# Patient Record
Sex: FEMALE | Race: WHITE | NOT HISPANIC OR LATINO | Employment: UNEMPLOYED | RURAL
[De-identification: names, ages, dates, MRNs, and addresses within clinical notes are randomized per-mention and may not be internally consistent; named-entity substitution may affect disease eponyms.]

---

## 2020-11-25 ENCOUNTER — HISTORICAL (OUTPATIENT)
Dept: ADMINISTRATIVE | Facility: HOSPITAL | Age: 65
End: 2020-11-25

## 2020-11-25 LAB
ALBUMIN SERPL BCP-MCNC: 4.1 G/DL (ref 3.5–5)
ALBUMIN/GLOB SERPL: 1.4 {RATIO}
ALP SERPL-CCNC: 83 U/L (ref 50–130)
ALT SERPL W P-5'-P-CCNC: 18 U/L (ref 13–56)
AMORPH PHOS CRY #/AREA URNS LPF: ABNORMAL /LPF
ANION GAP SERPL CALCULATED.3IONS-SCNC: 9 MMOL/L (ref 7–16)
AST SERPL W P-5'-P-CCNC: 14 U/L (ref 15–37)
BACTERIA #/AREA URNS HPF: ABNORMAL /HPF
BASOPHILS # BLD AUTO: 0.04 X10E3/UL (ref 0–0.2)
BASOPHILS NFR BLD AUTO: 0.7 % (ref 0–1)
BILIRUB SERPL-MCNC: 1 MG/DL (ref 0–1.2)
BILIRUB UR QL STRIP: NEGATIVE MG/DL
BUN SERPL-MCNC: 17 MG/DL (ref 7–18)
BUN/CREAT SERPL: 17
CALCIUM SERPL-MCNC: 8.8 MG/DL (ref 8.5–10.1)
CAOX CRY #/AREA URNS LPF: ABNORMAL /LPF
CHLORIDE SERPL-SCNC: 107 MMOL/L (ref 98–107)
CHOLEST SERPL-MCNC: 163 MG/DL
CHOLEST/HDLC SERPL: 2.8 {RATIO}
CLARITY UR: CLEAR
CO2 SERPL-SCNC: 28 MMOL/L (ref 21–32)
COLOR UR: YELLOW
CREAT SERPL-MCNC: 0.98 MG/DL (ref 0.5–1.02)
EOSINOPHIL # BLD AUTO: 0.24 X10E3/UL (ref 0–0.5)
EOSINOPHIL NFR BLD AUTO: 3.9 % (ref 1–4)
ERYTHROCYTE [DISTWIDTH] IN BLOOD BY AUTOMATED COUNT: 12.1 % (ref 11.5–14.5)
ERYTHROCYTE [SEDIMENTATION RATE] IN BLOOD BY WESTERGREN METHOD: 2 MM/HR (ref 0–30)
GLOBULIN SER-MCNC: 2.9 G/DL (ref 2–4)
GLUCOSE SERPL-MCNC: 83 MG/DL (ref 74–106)
GLUCOSE UR STRIP-MCNC: NEGATIVE MG/DL
HCT VFR BLD AUTO: 41.4 % (ref 38–47)
HDLC SERPL-MCNC: 59 MG/DL
HGB BLD-MCNC: 13.6 G/DL (ref 12–16)
IMM GRANULOCYTES # BLD AUTO: 0.02 X10E3/UL (ref 0–0.04)
IMM GRANULOCYTES NFR BLD: 0.3 % (ref 0–0.4)
KETONES UR STRIP-SCNC: NEGATIVE MG/DL
LDLC SERPL CALC-MCNC: 81 MG/DL
LEUKOCYTE ESTERASE UR QL STRIP: NEGATIVE LEU/UL
LYMPHOCYTES # BLD AUTO: 1.83 X10E3/UL (ref 1–4.8)
LYMPHOCYTES NFR BLD AUTO: 30.1 % (ref 27–41)
MCH RBC QN AUTO: 29.5 PG (ref 27–31)
MCHC RBC AUTO-ENTMCNC: 32.9 G/DL (ref 32–36)
MCV RBC AUTO: 89.8 FL (ref 80–96)
MONOCYTES # BLD AUTO: 0.72 X10E3/UL (ref 0–0.8)
MONOCYTES NFR BLD AUTO: 11.8 % (ref 2–6)
MPC BLD CALC-MCNC: 10.9 FL (ref 9.4–12.4)
MUCOUS THREADS #/AREA URNS HPF: ABNORMAL /HPF
NEUTROPHILS # BLD AUTO: 3.23 X10E3/UL (ref 1.8–7.7)
NEUTROPHILS NFR BLD AUTO: 53.2 % (ref 53–65)
NITRITE UR QL STRIP: NEGATIVE
NRBC # BLD AUTO: 0 X10E3/UL (ref 0–0)
NRBC, AUTO (.00): 0 /100 (ref 0–0)
PH UR STRIP: 5 PH UNITS (ref 5–8)
PLATELET # BLD AUTO: 256 X10E3/UL (ref 150–400)
POTASSIUM SERPL-SCNC: 3.9 MMOL/L (ref 3.5–5.1)
PROT SERPL-MCNC: 7 G/DL (ref 6.4–8.2)
PROT UR QL STRIP: NEGATIVE MG/DL
RBC # BLD AUTO: 4.61 X10E6/UL (ref 4.2–5.4)
RBC # UR STRIP: ABNORMAL ERY/UL
RBC #/AREA URNS HPF: ABNORMAL /HPF (ref 0–3)
SODIUM SERPL-SCNC: 140 MMOL/L (ref 136–145)
SP GR UR STRIP: >=1.03 (ref 1–1.03)
SQUAMOUS #/AREA URNS LPF: ABNORMAL /LPF
TRIGL SERPL-MCNC: 115 MG/DL
UROBILINOGEN UR STRIP-ACNC: 0.2 EU/DL
WBC # BLD AUTO: 6.08 X10E3/UL (ref 4.5–11)
WBC #/AREA URNS HPF: ABNORMAL /HPF (ref 0–5)

## 2020-11-27 LAB
REPORT: NORMAL

## 2020-12-01 LAB
INSULIN SERPL-ACNC: NORMAL U[IU]/ML
LAB AP COMMENTS: NORMAL
LAB AP GENERAL CAT - HISTORICAL: NORMAL
LAB AP INTERPRETATION/RESULT - HISTORICAL: NEGATIVE
LAB AP SPECIMEN ADEQUACY - HISTORICAL: NORMAL
LAB AP SPECIMEN SUBMITTED - HISTORICAL: NORMAL

## 2020-12-04 ENCOUNTER — HISTORICAL (OUTPATIENT)
Dept: ADMINISTRATIVE | Facility: HOSPITAL | Age: 65
End: 2020-12-04

## 2021-03-23 ENCOUNTER — TELEPHONE (OUTPATIENT)
Dept: OBSTETRICS AND GYNECOLOGY | Facility: CLINIC | Age: 66
End: 2021-03-23

## 2021-03-25 ENCOUNTER — OFFICE VISIT (OUTPATIENT)
Dept: FAMILY MEDICINE | Facility: CLINIC | Age: 66
End: 2021-03-25
Payer: MEDICARE

## 2021-03-25 VITALS
TEMPERATURE: 97 F | OXYGEN SATURATION: 99 % | WEIGHT: 120 LBS | HEART RATE: 105 BPM | HEIGHT: 61 IN | DIASTOLIC BLOOD PRESSURE: 64 MMHG | SYSTOLIC BLOOD PRESSURE: 106 MMHG | RESPIRATION RATE: 18 BRPM | BODY MASS INDEX: 22.66 KG/M2

## 2021-03-25 DIAGNOSIS — M26.609 TMJ (TEMPOROMANDIBULAR JOINT DISORDER): Primary | ICD-10-CM

## 2021-03-25 DIAGNOSIS — M25.511 ACUTE PAIN OF RIGHT SHOULDER: ICD-10-CM

## 2021-03-25 DIAGNOSIS — I10 ESSENTIAL HYPERTENSION: Chronic | ICD-10-CM

## 2021-03-25 PROCEDURE — 99213 PR OFFICE/OUTPT VISIT, EST, LEVL III, 20-29 MIN: ICD-10-PCS | Mod: 25,,, | Performed by: FAMILY MEDICINE

## 2021-03-25 PROCEDURE — 96372 THER/PROPH/DIAG INJ SC/IM: CPT | Mod: ,,, | Performed by: FAMILY MEDICINE

## 2021-03-25 PROCEDURE — 96372 PR INJECTION,THERAP/PROPH/DIAG2ST, IM OR SUBCUT: ICD-10-PCS | Mod: ,,, | Performed by: FAMILY MEDICINE

## 2021-03-25 PROCEDURE — 99213 OFFICE O/P EST LOW 20 MIN: CPT | Mod: 25,,, | Performed by: FAMILY MEDICINE

## 2021-03-25 PROCEDURE — 73030 X-RAY EXAM OF SHOULDER: CPT | Mod: TC,RT,RHCUB | Performed by: FAMILY MEDICINE

## 2021-03-25 RX ORDER — METHYLPREDNISOLONE ACETATE 80 MG/ML
80 INJECTION, SUSPENSION INTRA-ARTICULAR; INTRALESIONAL; INTRAMUSCULAR; SOFT TISSUE
Status: COMPLETED | OUTPATIENT
Start: 2021-03-25 | End: 2021-03-25

## 2021-03-25 RX ORDER — CITALOPRAM 40 MG/1
TABLET, FILM COATED ORAL
COMMUNITY
Start: 2021-01-23 | End: 2022-07-22 | Stop reason: SDUPTHER

## 2021-03-25 RX ORDER — ROSUVASTATIN CALCIUM 10 MG/1
TABLET, COATED ORAL
COMMUNITY
Start: 2021-01-23 | End: 2022-07-22 | Stop reason: SDUPTHER

## 2021-03-25 RX ORDER — TIZANIDINE 4 MG/1
4 TABLET ORAL NIGHTLY PRN
Qty: 30 TABLET | Refills: 11 | Status: SHIPPED | OUTPATIENT
Start: 2021-03-25 | End: 2021-04-04

## 2021-03-25 RX ORDER — ASPIRIN 81 MG/1
81 TABLET ORAL DAILY
COMMUNITY
End: 2022-07-22

## 2021-03-25 RX ORDER — MELOXICAM 15 MG/1
15 TABLET ORAL DAILY PRN
Qty: 30 TABLET | Refills: 11 | Status: SHIPPED | OUTPATIENT
Start: 2021-03-25 | End: 2022-07-22 | Stop reason: SDUPTHER

## 2021-03-25 RX ORDER — LISINOPRIL 20 MG/1
TABLET ORAL
COMMUNITY
Start: 2021-01-23 | End: 2022-07-22 | Stop reason: SDUPTHER

## 2021-03-25 RX ORDER — TOLTERODINE 4 MG/1
CAPSULE, EXTENDED RELEASE ORAL
COMMUNITY
Start: 2021-01-22 | End: 2021-10-20 | Stop reason: SDUPTHER

## 2021-03-25 RX ADMIN — METHYLPREDNISOLONE ACETATE 80 MG: 80 INJECTION, SUSPENSION INTRA-ARTICULAR; INTRALESIONAL; INTRAMUSCULAR; SOFT TISSUE at 04:03

## 2021-03-26 ENCOUNTER — TELEPHONE (OUTPATIENT)
Dept: FAMILY MEDICINE | Facility: CLINIC | Age: 66
End: 2021-03-26

## 2021-04-07 RX ORDER — TERCONAZOLE 8 MG/G
1 CREAM VAGINAL NIGHTLY
Status: ON HOLD | COMMUNITY
Start: 2016-10-04 | End: 2021-04-20 | Stop reason: CLARIF

## 2021-04-07 RX ORDER — PRAMOXINE HYDROCHLORIDE HYDROCORTISONE ACETATE 100; 100 MG/10G; MG/10G
1 AEROSOL, FOAM TOPICAL 3 TIMES DAILY
Status: ON HOLD | COMMUNITY
Start: 2020-12-23 | End: 2021-04-20 | Stop reason: CLARIF

## 2021-04-07 RX ORDER — ESTRADIOL 0.1 MG/G
1 CREAM VAGINAL
Status: ON HOLD | COMMUNITY
Start: 2016-10-04 | End: 2021-04-20 | Stop reason: CLARIF

## 2021-04-10 DIAGNOSIS — M25.511 ACUTE PAIN OF RIGHT SHOULDER: Primary | ICD-10-CM

## 2021-04-13 ENCOUNTER — HOSPITAL ENCOUNTER (OUTPATIENT)
Dept: RADIOLOGY | Facility: HOSPITAL | Age: 66
Discharge: HOME | End: 2021-04-13
Attending: ORTHOPAEDIC SURGERY
Payer: MEDICARE

## 2021-04-13 ENCOUNTER — OFFICE VISIT (OUTPATIENT)
Dept: ORTHOPEDICS | Facility: CLINIC | Age: 66
End: 2021-04-13
Payer: MEDICARE

## 2021-04-13 DIAGNOSIS — M25.511 RIGHT SHOULDER PAIN: ICD-10-CM

## 2021-04-13 DIAGNOSIS — M25.511 ACUTE PAIN OF RIGHT SHOULDER: ICD-10-CM

## 2021-04-13 DIAGNOSIS — S46.011A TRAUMATIC TEAR OF RIGHT ROTATOR CUFF, UNSPECIFIED TEAR EXTENT, INITIAL ENCOUNTER: Primary | ICD-10-CM

## 2021-04-13 PROCEDURE — 73030 X-RAY EXAM OF SHOULDER: CPT | Mod: 26,RT,, | Performed by: ORTHOPAEDIC SURGERY

## 2021-04-13 PROCEDURE — 73030 XR SHOULDER COMPLETE 2 OR MORE VIEWS RIGHT: ICD-10-PCS | Mod: 26,RT,, | Performed by: ORTHOPAEDIC SURGERY

## 2021-04-13 PROCEDURE — 99203 PR OFFICE/OUTPT VISIT, NEW, LEVL III, 30-44 MIN: ICD-10-PCS | Mod: ,,, | Performed by: ORTHOPAEDIC SURGERY

## 2021-04-13 PROCEDURE — 73030 X-RAY EXAM OF SHOULDER: CPT | Mod: TC,RT

## 2021-04-13 PROCEDURE — 99203 OFFICE O/P NEW LOW 30 MIN: CPT | Mod: ,,, | Performed by: ORTHOPAEDIC SURGERY

## 2021-04-15 ENCOUNTER — OFFICE VISIT (OUTPATIENT)
Dept: OBSTETRICS AND GYNECOLOGY | Facility: CLINIC | Age: 66
End: 2021-04-15
Payer: MEDICARE

## 2021-04-15 VITALS
SYSTOLIC BLOOD PRESSURE: 125 MMHG | DIASTOLIC BLOOD PRESSURE: 75 MMHG | HEIGHT: 61 IN | RESPIRATION RATE: 16 BRPM | HEART RATE: 72 BPM | WEIGHT: 120 LBS | TEMPERATURE: 98 F | BODY MASS INDEX: 22.66 KG/M2

## 2021-04-15 DIAGNOSIS — I10 ESSENTIAL HYPERTENSION: ICD-10-CM

## 2021-04-15 DIAGNOSIS — Z87.42 HISTORY OF POSTMENOPAUSAL BLEEDING: ICD-10-CM

## 2021-04-15 DIAGNOSIS — G89.29 CHRONIC PELVIC PAIN IN FEMALE: ICD-10-CM

## 2021-04-15 DIAGNOSIS — N32.81 OAB (OVERACTIVE BLADDER): ICD-10-CM

## 2021-04-15 DIAGNOSIS — I10 HYPERTENSION: ICD-10-CM

## 2021-04-15 DIAGNOSIS — R10.2 CHRONIC PELVIC PAIN IN FEMALE: ICD-10-CM

## 2021-04-15 DIAGNOSIS — Z01.818 PREOPERATIVE TESTING: Primary | ICD-10-CM

## 2021-04-15 DIAGNOSIS — M25.511 RIGHT SHOULDER PAIN, UNSPECIFIED CHRONICITY: ICD-10-CM

## 2021-04-15 DIAGNOSIS — N95.2 VAGINITIS, ATROPHIC: ICD-10-CM

## 2021-04-15 DIAGNOSIS — R93.89 ENDOMETRIAL THICKENING ON ULTRASOUND: ICD-10-CM

## 2021-04-15 DIAGNOSIS — R10.2 PELVIC PAIN IN FEMALE: ICD-10-CM

## 2021-04-15 DIAGNOSIS — N95.0 POSTMENOPAUSAL VAGINAL BLEEDING: ICD-10-CM

## 2021-04-15 PROCEDURE — 99024 POSTOP FOLLOW-UP VISIT: CPT | Mod: ,,, | Performed by: OBSTETRICS & GYNECOLOGY

## 2021-04-15 PROCEDURE — 99024 PR POST-OP FOLLOW-UP VISIT: ICD-10-PCS | Mod: ,,, | Performed by: OBSTETRICS & GYNECOLOGY

## 2021-04-15 RX ORDER — LIDOCAINE HYDROCHLORIDE 10 MG/ML
1 INJECTION, SOLUTION EPIDURAL; INFILTRATION; INTRACAUDAL; PERINEURAL ONCE
Status: CANCELLED | OUTPATIENT
Start: 2021-04-15 | End: 2021-04-15

## 2021-04-15 RX ORDER — ONDANSETRON 2 MG/ML
4 INJECTION INTRAMUSCULAR; INTRAVENOUS EVERY 12 HOURS PRN
Status: CANCELLED | OUTPATIENT
Start: 2021-04-15

## 2021-04-15 RX ORDER — MUPIROCIN 20 MG/G
OINTMENT TOPICAL
Status: CANCELLED | OUTPATIENT
Start: 2021-04-15

## 2021-04-15 RX ORDER — SODIUM CHLORIDE, SODIUM LACTATE, POTASSIUM CHLORIDE, CALCIUM CHLORIDE 600; 310; 30; 20 MG/100ML; MG/100ML; MG/100ML; MG/100ML
INJECTION, SOLUTION INTRAVENOUS CONTINUOUS
Status: CANCELLED | OUTPATIENT
Start: 2021-04-15 | End: 2021-04-15

## 2021-04-16 ENCOUNTER — PATIENT MESSAGE (OUTPATIENT)
Dept: SURGERY | Facility: HOSPITAL | Age: 66
End: 2021-04-16

## 2021-04-19 ENCOUNTER — HOSPITAL ENCOUNTER (OUTPATIENT)
Dept: RADIOLOGY | Facility: HOSPITAL | Age: 66
Discharge: HOME | End: 2021-04-19
Attending: OBSTETRICS & GYNECOLOGY
Payer: MEDICARE

## 2021-04-19 ENCOUNTER — TELEPHONE (OUTPATIENT)
Dept: OBSTETRICS AND GYNECOLOGY | Facility: CLINIC | Age: 66
End: 2021-04-19

## 2021-04-19 DIAGNOSIS — Z01.818 ENCOUNTER FOR OTHER PREPROCEDURAL EXAMINATION: Primary | ICD-10-CM

## 2021-04-19 PROCEDURE — 71046 X-RAY EXAM CHEST 2 VIEWS: CPT | Mod: TC

## 2021-04-19 PROCEDURE — 93010 EKG 12-LEAD: ICD-10-PCS | Mod: ,,, | Performed by: STUDENT IN AN ORGANIZED HEALTH CARE EDUCATION/TRAINING PROGRAM

## 2021-04-19 PROCEDURE — 71046 X-RAY EXAM CHEST 2 VIEWS: CPT | Mod: 26,,, | Performed by: RADIOLOGY

## 2021-04-19 PROCEDURE — 71046 XR CHEST PA AND LATERAL: ICD-10-PCS | Mod: 26,,, | Performed by: RADIOLOGY

## 2021-04-19 PROCEDURE — 93010 ELECTROCARDIOGRAM REPORT: CPT | Mod: ,,, | Performed by: STUDENT IN AN ORGANIZED HEALTH CARE EDUCATION/TRAINING PROGRAM

## 2021-04-20 ENCOUNTER — ANESTHESIA (OUTPATIENT)
Dept: SURGERY | Facility: HOSPITAL | Age: 66
End: 2021-04-20
Payer: MEDICARE

## 2021-04-20 ENCOUNTER — HOSPITAL ENCOUNTER (OUTPATIENT)
Facility: HOSPITAL | Age: 66
Discharge: HOME | End: 2021-04-20
Attending: OBSTETRICS & GYNECOLOGY | Admitting: OBSTETRICS & GYNECOLOGY
Payer: MEDICARE

## 2021-04-20 ENCOUNTER — ANESTHESIA EVENT (OUTPATIENT)
Dept: SURGERY | Facility: HOSPITAL | Age: 66
End: 2021-04-20
Payer: MEDICARE

## 2021-04-20 VITALS
WEIGHT: 117 LBS | HEART RATE: 75 BPM | DIASTOLIC BLOOD PRESSURE: 75 MMHG | BODY MASS INDEX: 22.09 KG/M2 | SYSTOLIC BLOOD PRESSURE: 118 MMHG | HEIGHT: 61 IN | OXYGEN SATURATION: 98 % | TEMPERATURE: 98 F | RESPIRATION RATE: 16 BRPM

## 2021-04-20 DIAGNOSIS — N94.10 FEMALE DYSPAREUNIA: ICD-10-CM

## 2021-04-20 DIAGNOSIS — G89.29 CHRONIC PELVIC PAIN IN FEMALE: ICD-10-CM

## 2021-04-20 DIAGNOSIS — N95.0 POST-MENOPAUSAL BLEEDING: Primary | ICD-10-CM

## 2021-04-20 DIAGNOSIS — R10.2 CHRONIC PELVIC PAIN IN FEMALE: ICD-10-CM

## 2021-04-20 DIAGNOSIS — R93.89 ENDOMETRIAL THICKENING ON ULTRASOUND: ICD-10-CM

## 2021-04-20 DIAGNOSIS — N95.0 POSTMENOPAUSAL VAGINAL BLEEDING: ICD-10-CM

## 2021-04-20 DIAGNOSIS — N93.0 POSTCOITAL BLEEDING: ICD-10-CM

## 2021-04-20 DIAGNOSIS — R93.89 THICKENED ENDOMETRIUM: ICD-10-CM

## 2021-04-20 PROBLEM — Z87.42 HISTORY OF POSTMENOPAUSAL BLEEDING: Status: RESOLVED | Noted: 2021-04-15 | Resolved: 2021-04-20

## 2021-04-20 PROCEDURE — 63600175 PHARM REV CODE 636 W HCPCS: Performed by: OBSTETRICS & GYNECOLOGY

## 2021-04-20 PROCEDURE — 88305 SURGICAL PATHOLOGY: ICD-10-PCS | Mod: 26,,, | Performed by: PATHOLOGY

## 2021-04-20 PROCEDURE — 58558 HYSTEROSCOPY BIOPSY: CPT | Mod: 51,,, | Performed by: OBSTETRICS & GYNECOLOGY

## 2021-04-20 PROCEDURE — 37000009 HC ANESTHESIA EA ADD 15 MINS: Performed by: OBSTETRICS & GYNECOLOGY

## 2021-04-20 PROCEDURE — 49320 DIAG LAPARO SEPARATE PROC: CPT | Mod: ,,, | Performed by: OBSTETRICS & GYNECOLOGY

## 2021-04-20 PROCEDURE — 88305 TISSUE EXAM BY PATHOLOGIST: CPT | Mod: 26,,, | Performed by: PATHOLOGY

## 2021-04-20 PROCEDURE — 27000260 *HC AIRWAY ORAL: Performed by: ANESTHESIOLOGY

## 2021-04-20 PROCEDURE — 63600175 PHARM REV CODE 636 W HCPCS: Performed by: NURSE ANESTHETIST, CERTIFIED REGISTERED

## 2021-04-20 PROCEDURE — 71000016 HC POSTOP RECOV ADDL HR: Performed by: OBSTETRICS & GYNECOLOGY

## 2021-04-20 PROCEDURE — 71000033 HC RECOVERY, INTIAL HOUR: Performed by: OBSTETRICS & GYNECOLOGY

## 2021-04-20 PROCEDURE — 27000165 HC TUBE, ETT CUFFED: Performed by: ANESTHESIOLOGY

## 2021-04-20 PROCEDURE — 58558 PR HYSTEROSCOPY,W/ENDO BX: ICD-10-PCS | Mod: 51,,, | Performed by: OBSTETRICS & GYNECOLOGY

## 2021-04-20 PROCEDURE — 25000003 PHARM REV CODE 250: Performed by: OBSTETRICS & GYNECOLOGY

## 2021-04-20 PROCEDURE — 49320 PR LAP,DIAGNOSTIC ABDOMEN: ICD-10-PCS | Mod: ,,, | Performed by: OBSTETRICS & GYNECOLOGY

## 2021-04-20 PROCEDURE — 88305 TISSUE EXAM BY PATHOLOGIST: CPT | Mod: SUR | Performed by: OBSTETRICS & GYNECOLOGY

## 2021-04-20 PROCEDURE — 36000708 HC OR TIME LEV III 1ST 15 MIN: Performed by: OBSTETRICS & GYNECOLOGY

## 2021-04-20 PROCEDURE — D9220A PRA ANESTHESIA: Mod: ,,, | Performed by: ANESTHESIOLOGY

## 2021-04-20 PROCEDURE — 25000003 PHARM REV CODE 250: Performed by: NURSE ANESTHETIST, CERTIFIED REGISTERED

## 2021-04-20 PROCEDURE — 71000015 HC POSTOP RECOV 1ST HR: Performed by: OBSTETRICS & GYNECOLOGY

## 2021-04-20 PROCEDURE — 27000716 HC OXISENSOR PROBE, ANY SIZE: Performed by: ANESTHESIOLOGY

## 2021-04-20 PROCEDURE — 36000709 HC OR TIME LEV III EA ADD 15 MIN: Performed by: OBSTETRICS & GYNECOLOGY

## 2021-04-20 PROCEDURE — 27000689 HC BLADE LARYNGOSCOPE ANY SIZE: Performed by: ANESTHESIOLOGY

## 2021-04-20 PROCEDURE — 27000655: Performed by: ANESTHESIOLOGY

## 2021-04-20 PROCEDURE — D9220A PRA ANESTHESIA: ICD-10-PCS | Mod: ,,, | Performed by: ANESTHESIOLOGY

## 2021-04-20 PROCEDURE — 27201423 OPTIME MED/SURG SUP & DEVICES STERILE SUPPLY: Performed by: OBSTETRICS & GYNECOLOGY

## 2021-04-20 PROCEDURE — 37000008 HC ANESTHESIA 1ST 15 MINUTES: Performed by: OBSTETRICS & GYNECOLOGY

## 2021-04-20 PROCEDURE — 27000509 HC TUBE SALEM SUMP ANY SIZE: Performed by: ANESTHESIOLOGY

## 2021-04-20 RX ORDER — DEXAMETHASONE SODIUM PHOSPHATE 4 MG/ML
INJECTION, SOLUTION INTRA-ARTICULAR; INTRALESIONAL; INTRAMUSCULAR; INTRAVENOUS; SOFT TISSUE
Status: DISCONTINUED | OUTPATIENT
Start: 2021-04-20 | End: 2021-04-20

## 2021-04-20 RX ORDER — PHENYLEPHRINE HYDROCHLORIDE 10 MG/ML
INJECTION INTRAVENOUS
Status: DISCONTINUED | OUTPATIENT
Start: 2021-04-20 | End: 2021-04-20

## 2021-04-20 RX ORDER — MUPIROCIN 20 MG/G
OINTMENT TOPICAL
Status: DISCONTINUED | OUTPATIENT
Start: 2021-04-20 | End: 2021-04-20 | Stop reason: HOSPADM

## 2021-04-20 RX ORDER — OXYCODONE HYDROCHLORIDE 5 MG/1
5 TABLET ORAL
Status: DISCONTINUED | OUTPATIENT
Start: 2021-04-20 | End: 2021-04-20 | Stop reason: HOSPADM

## 2021-04-20 RX ORDER — DICLOFENAC POTASSIUM 50 MG/1
50 TABLET, FILM COATED ORAL 3 TIMES DAILY PRN
Qty: 30 TABLET | Refills: 0 | Status: SHIPPED | OUTPATIENT
Start: 2021-04-20 | End: 2021-04-30

## 2021-04-20 RX ORDER — IPRATROPIUM BROMIDE AND ALBUTEROL SULFATE 2.5; .5 MG/3ML; MG/3ML
3 SOLUTION RESPIRATORY (INHALATION)
Status: DISCONTINUED | OUTPATIENT
Start: 2021-04-20 | End: 2021-04-20 | Stop reason: HOSPADM

## 2021-04-20 RX ORDER — ONDANSETRON 2 MG/ML
4 INJECTION INTRAMUSCULAR; INTRAVENOUS EVERY 12 HOURS PRN
Status: DISCONTINUED | OUTPATIENT
Start: 2021-04-20 | End: 2021-04-20

## 2021-04-20 RX ORDER — SODIUM CHLORIDE, SODIUM LACTATE, POTASSIUM CHLORIDE, CALCIUM CHLORIDE 600; 310; 30; 20 MG/100ML; MG/100ML; MG/100ML; MG/100ML
125 INJECTION, SOLUTION INTRAVENOUS CONTINUOUS
Status: DISCONTINUED | OUTPATIENT
Start: 2021-04-20 | End: 2021-04-20 | Stop reason: HOSPADM

## 2021-04-20 RX ORDER — KETOROLAC TROMETHAMINE 30 MG/ML
30 INJECTION, SOLUTION INTRAMUSCULAR; INTRAVENOUS ONCE
Status: COMPLETED | OUTPATIENT
Start: 2021-04-20 | End: 2021-04-20

## 2021-04-20 RX ORDER — GLYCOPYRROLATE 0.2 MG/ML
INJECTION INTRAMUSCULAR; INTRAVENOUS
Status: DISCONTINUED | OUTPATIENT
Start: 2021-04-20 | End: 2021-04-20

## 2021-04-20 RX ORDER — FENTANYL CITRATE 50 UG/ML
INJECTION, SOLUTION INTRAMUSCULAR; INTRAVENOUS
Status: DISCONTINUED | OUTPATIENT
Start: 2021-04-20 | End: 2021-04-20

## 2021-04-20 RX ORDER — ROCURONIUM BROMIDE 10 MG/ML
INJECTION, SOLUTION INTRAVENOUS
Status: DISCONTINUED | OUTPATIENT
Start: 2021-04-20 | End: 2021-04-20

## 2021-04-20 RX ORDER — SODIUM CHLORIDE 9 MG/ML
INJECTION, SOLUTION INTRAVENOUS CONTINUOUS
Status: DISCONTINUED | OUTPATIENT
Start: 2021-04-20 | End: 2021-04-20 | Stop reason: HOSPADM

## 2021-04-20 RX ORDER — TRAMADOL HYDROCHLORIDE 50 MG/1
50 TABLET ORAL EVERY 4 HOURS PRN
Status: DISCONTINUED | OUTPATIENT
Start: 2021-04-20 | End: 2021-04-20 | Stop reason: HOSPADM

## 2021-04-20 RX ORDER — OXYCODONE AND ACETAMINOPHEN 10; 325 MG/1; MG/1
1 TABLET ORAL EVERY 4 HOURS PRN
Status: DISCONTINUED | OUTPATIENT
Start: 2021-04-20 | End: 2021-04-20 | Stop reason: HOSPADM

## 2021-04-20 RX ORDER — LIDOCAINE HYDROCHLORIDE 10 MG/ML
1 INJECTION INFILTRATION; PERINEURAL ONCE
Status: DISCONTINUED | OUTPATIENT
Start: 2021-04-20 | End: 2021-04-20 | Stop reason: HOSPADM

## 2021-04-20 RX ORDER — DIPHENHYDRAMINE HYDROCHLORIDE 50 MG/ML
25 INJECTION INTRAMUSCULAR; INTRAVENOUS EVERY 4 HOURS PRN
Status: DISCONTINUED | OUTPATIENT
Start: 2021-04-20 | End: 2021-04-20 | Stop reason: HOSPADM

## 2021-04-20 RX ORDER — CEFAZOLIN SODIUM 2 G/50ML
2 SOLUTION INTRAVENOUS
Status: DISCONTINUED | OUTPATIENT
Start: 2021-04-20 | End: 2021-04-20 | Stop reason: HOSPADM

## 2021-04-20 RX ORDER — ONDANSETRON 2 MG/ML
4 INJECTION INTRAMUSCULAR; INTRAVENOUS DAILY PRN
Status: DISCONTINUED | OUTPATIENT
Start: 2021-04-20 | End: 2021-04-20 | Stop reason: HOSPADM

## 2021-04-20 RX ORDER — HYDROMORPHONE HYDROCHLORIDE 2 MG/ML
0.5 INJECTION, SOLUTION INTRAMUSCULAR; INTRAVENOUS; SUBCUTANEOUS EVERY 5 MIN PRN
Status: DISCONTINUED | OUTPATIENT
Start: 2021-04-20 | End: 2021-04-20 | Stop reason: HOSPADM

## 2021-04-20 RX ORDER — LIDOCAINE HYDROCHLORIDE 40 MG/ML
SOLUTION TOPICAL
Status: DISCONTINUED | OUTPATIENT
Start: 2021-04-20 | End: 2021-04-20 | Stop reason: HOSPADM

## 2021-04-20 RX ORDER — PROPOFOL 10 MG/ML
VIAL (ML) INTRAVENOUS
Status: DISCONTINUED | OUTPATIENT
Start: 2021-04-20 | End: 2021-04-20

## 2021-04-20 RX ORDER — DIPHENHYDRAMINE HCL 25 MG
25 CAPSULE ORAL EVERY 4 HOURS PRN
Status: DISCONTINUED | OUTPATIENT
Start: 2021-04-20 | End: 2021-04-20 | Stop reason: HOSPADM

## 2021-04-20 RX ORDER — NEOSTIGMINE METHYLSULFATE 1 MG/ML
INJECTION, SOLUTION INTRAVENOUS
Status: DISCONTINUED | OUTPATIENT
Start: 2021-04-20 | End: 2021-04-20

## 2021-04-20 RX ORDER — MIDAZOLAM HYDROCHLORIDE 1 MG/ML
INJECTION, SOLUTION INTRAMUSCULAR; INTRAVENOUS
Status: DISCONTINUED | OUTPATIENT
Start: 2021-04-20 | End: 2021-04-20

## 2021-04-20 RX ORDER — MEPERIDINE HYDROCHLORIDE 25 MG/ML
25 INJECTION INTRAMUSCULAR; INTRAVENOUS; SUBCUTANEOUS EVERY 10 MIN PRN
Status: DISCONTINUED | OUTPATIENT
Start: 2021-04-20 | End: 2021-04-20 | Stop reason: HOSPADM

## 2021-04-20 RX ORDER — ONDANSETRON 4 MG/1
8 TABLET, ORALLY DISINTEGRATING ORAL EVERY 8 HOURS PRN
Status: DISCONTINUED | OUTPATIENT
Start: 2021-04-20 | End: 2021-04-20 | Stop reason: HOSPADM

## 2021-04-20 RX ORDER — LIDOCAINE HYDROCHLORIDE 20 MG/ML
INJECTION INTRAVENOUS
Status: DISCONTINUED | OUTPATIENT
Start: 2021-04-20 | End: 2021-04-20

## 2021-04-20 RX ORDER — ONDANSETRON 2 MG/ML
8 INJECTION INTRAMUSCULAR; INTRAVENOUS EVERY 6 HOURS PRN
Status: DISCONTINUED | OUTPATIENT
Start: 2021-04-20 | End: 2021-04-20 | Stop reason: HOSPADM

## 2021-04-20 RX ORDER — DIPHENHYDRAMINE HYDROCHLORIDE 50 MG/ML
25 INJECTION INTRAMUSCULAR; INTRAVENOUS EVERY 6 HOURS PRN
Status: DISCONTINUED | OUTPATIENT
Start: 2021-04-20 | End: 2021-04-20 | Stop reason: HOSPADM

## 2021-04-20 RX ORDER — SODIUM CHLORIDE, SODIUM LACTATE, POTASSIUM CHLORIDE, CALCIUM CHLORIDE 600; 310; 30; 20 MG/100ML; MG/100ML; MG/100ML; MG/100ML
INJECTION, SOLUTION INTRAVENOUS CONTINUOUS
Status: DISCONTINUED | OUTPATIENT
Start: 2021-04-20 | End: 2021-04-20 | Stop reason: HOSPADM

## 2021-04-20 RX ORDER — MORPHINE SULFATE 10 MG/ML
4 INJECTION INTRAMUSCULAR; INTRAVENOUS; SUBCUTANEOUS EVERY 5 MIN PRN
Status: DISCONTINUED | OUTPATIENT
Start: 2021-04-20 | End: 2021-04-20 | Stop reason: HOSPADM

## 2021-04-20 RX ADMIN — MIDAZOLAM HYDROCHLORIDE 2 MG: 1 INJECTION, SOLUTION INTRAMUSCULAR; INTRAVENOUS at 07:04

## 2021-04-20 RX ADMIN — PHENYLEPHRINE HYDROCHLORIDE 100 MCG: 10 INJECTION INTRAVENOUS at 07:04

## 2021-04-20 RX ADMIN — NEOSTIGMINE METHYLSULFATE 3 MG: 1 INJECTION INTRAVENOUS at 08:04

## 2021-04-20 RX ADMIN — DEXAMETHASONE SODIUM PHOSPHATE 8 MG: 4 INJECTION, SOLUTION INTRA-ARTICULAR; INTRALESIONAL; INTRAMUSCULAR; INTRAVENOUS; SOFT TISSUE at 07:04

## 2021-04-20 RX ADMIN — CEFAZOLIN 2 G: 1 INJECTION, POWDER, FOR SOLUTION INTRAMUSCULAR; INTRAVENOUS; PARENTERAL at 07:04

## 2021-04-20 RX ADMIN — ONDANSETRON 4 MG: 2 INJECTION INTRAMUSCULAR; INTRAVENOUS at 07:04

## 2021-04-20 RX ADMIN — KETOROLAC TROMETHAMINE 30 MG: 30 INJECTION, SOLUTION INTRAMUSCULAR; INTRAVENOUS at 08:04

## 2021-04-20 RX ADMIN — PROPOFOL 150 MG: 10 INJECTION, EMULSION INTRAVENOUS at 07:04

## 2021-04-20 RX ADMIN — GLYCOPYRROLATE 0.4 MG: 0.2 INJECTION INTRAMUSCULAR; INTRAVENOUS at 08:04

## 2021-04-20 RX ADMIN — LIDOCAINE HYDROCHLORIDE 50 MG: 20 INJECTION, SOLUTION INTRAVENOUS at 07:04

## 2021-04-20 RX ADMIN — SODIUM CHLORIDE, POTASSIUM CHLORIDE, SODIUM LACTATE AND CALCIUM CHLORIDE: 600; 310; 30; 20 INJECTION, SOLUTION INTRAVENOUS at 07:04

## 2021-04-20 RX ADMIN — FENTANYL CITRATE 100 MCG: 50 INJECTION INTRAMUSCULAR; INTRAVENOUS at 07:04

## 2021-04-20 RX ADMIN — ROCURONIUM BROMIDE 30 MG: 10 INJECTION INTRAVENOUS at 07:04

## 2021-04-21 LAB
ESTROGEN SERPL-MCNC: NORMAL PG/ML
LAB AP GROSS DESCRIPTION: NORMAL
LAB AP LABORATORY NOTES: NORMAL
T3RU NFR SERPL: NORMAL %

## 2021-04-30 ENCOUNTER — HOSPITAL ENCOUNTER (OUTPATIENT)
Dept: RADIOLOGY | Facility: HOSPITAL | Age: 66
Discharge: HOME | End: 2021-04-30
Attending: OBSTETRICS & GYNECOLOGY
Payer: MEDICARE

## 2021-04-30 PROCEDURE — 74160 CT ABDOMEN WITH CONTRAST: ICD-10-PCS | Mod: 26,,, | Performed by: RADIOLOGY

## 2021-04-30 PROCEDURE — 74160 CT ABDOMEN W/CONTRAST: CPT | Mod: TC

## 2021-04-30 PROCEDURE — 25500020 PHARM REV CODE 255: Performed by: OBSTETRICS & GYNECOLOGY

## 2021-04-30 PROCEDURE — 74160 CT ABDOMEN W/CONTRAST: CPT | Mod: 26,,, | Performed by: RADIOLOGY

## 2021-04-30 RX ADMIN — IOPAMIDOL 100 ML: 755 INJECTION, SOLUTION INTRAVENOUS at 08:04

## 2021-05-04 ENCOUNTER — OFFICE VISIT (OUTPATIENT)
Dept: OBSTETRICS AND GYNECOLOGY | Facility: CLINIC | Age: 66
End: 2021-05-04
Payer: MEDICARE

## 2021-05-04 VITALS
WEIGHT: 116 LBS | BODY MASS INDEX: 21.9 KG/M2 | HEIGHT: 61 IN | RESPIRATION RATE: 16 BRPM | DIASTOLIC BLOOD PRESSURE: 76 MMHG | SYSTOLIC BLOOD PRESSURE: 114 MMHG | TEMPERATURE: 98 F | HEART RATE: 71 BPM

## 2021-05-04 DIAGNOSIS — N94.10 DYSPAREUNIA, FEMALE: Primary | ICD-10-CM

## 2021-05-04 DIAGNOSIS — R10.2 PELVIC PAIN IN FEMALE: ICD-10-CM

## 2021-05-04 DIAGNOSIS — N32.81 OAB (OVERACTIVE BLADDER): ICD-10-CM

## 2021-05-04 LAB
BASOPHILS # BLD AUTO: 0.03 K/UL (ref 0–0.2)
BASOPHILS NFR BLD AUTO: 0.4 % (ref 0–1)
BILIRUB UR QL STRIP: NEGATIVE
CLARITY UR: CLEAR
COLOR UR: YELLOW
DIFFERENTIAL METHOD BLD: ABNORMAL
EOSINOPHIL # BLD AUTO: 0.22 K/UL (ref 0–0.5)
EOSINOPHIL NFR BLD AUTO: 3.3 % (ref 1–4)
ERYTHROCYTE [DISTWIDTH] IN BLOOD BY AUTOMATED COUNT: 12.4 % (ref 11.5–14.5)
GLUCOSE UR STRIP-MCNC: NEGATIVE MG/DL
HCT VFR BLD AUTO: 42.3 % (ref 38–47)
HGB BLD-MCNC: 13.9 G/DL (ref 12–16)
IMM GRANULOCYTES # BLD AUTO: 0.02 K/UL (ref 0–0.04)
IMM GRANULOCYTES NFR BLD: 0.3 % (ref 0–0.4)
KETONES UR STRIP-SCNC: NEGATIVE MG/DL
LEUKOCYTE ESTERASE UR QL STRIP: NEGATIVE
LYMPHOCYTES # BLD AUTO: 1.98 K/UL (ref 1–4.8)
LYMPHOCYTES NFR BLD AUTO: 29.5 % (ref 27–41)
MCH RBC QN AUTO: 30 PG (ref 27–31)
MCHC RBC AUTO-ENTMCNC: 32.9 G/DL (ref 32–36)
MCV RBC AUTO: 91.2 FL (ref 80–96)
MONOCYTES # BLD AUTO: 0.57 K/UL (ref 0–0.8)
MONOCYTES NFR BLD AUTO: 8.5 % (ref 2–6)
MPC BLD CALC-MCNC: 10.8 FL (ref 9.4–12.4)
NEUTROPHILS # BLD AUTO: 3.89 K/UL (ref 1.8–7.7)
NEUTROPHILS NFR BLD AUTO: 58 % (ref 53–65)
NITRITE UR QL STRIP: NEGATIVE
NRBC # BLD AUTO: 0 X10E3/UL
NRBC, AUTO (.00): 0 %
PH UR STRIP: 5.5 PH UNITS
PLATELET # BLD AUTO: 300 K/UL (ref 150–400)
PROT UR QL STRIP: NEGATIVE
RBC # BLD AUTO: 4.64 M/UL (ref 4.2–5.4)
RBC # UR STRIP: ABNORMAL /UL
SP GR UR STRIP: >=1.03
UROBILINOGEN UR STRIP-ACNC: 0.2 MG/DL
WBC # BLD AUTO: 6.71 K/UL (ref 4.5–11)

## 2021-05-04 PROCEDURE — 36415 PR COLLECTION VENOUS BLOOD,VENIPUNCTURE: ICD-10-PCS | Mod: ,,, | Performed by: OBSTETRICS & GYNECOLOGY

## 2021-05-04 PROCEDURE — 81003 URINALYSIS AUTO W/O SCOPE: CPT | Mod: QW,,, | Performed by: CLINICAL MEDICAL LABORATORY

## 2021-05-04 PROCEDURE — 81003 URINALYSIS: ICD-10-PCS | Mod: QW,,, | Performed by: CLINICAL MEDICAL LABORATORY

## 2021-05-04 PROCEDURE — 99214 PR OFFICE/OUTPT VISIT, EST, LEVL IV, 30-39 MIN: ICD-10-PCS | Mod: ,,, | Performed by: OBSTETRICS & GYNECOLOGY

## 2021-05-04 PROCEDURE — 85025 COMPLETE CBC W/AUTO DIFF WBC: CPT | Mod: ,,, | Performed by: CLINICAL MEDICAL LABORATORY

## 2021-05-04 PROCEDURE — 99214 OFFICE O/P EST MOD 30 MIN: CPT | Mod: ,,, | Performed by: OBSTETRICS & GYNECOLOGY

## 2021-05-04 PROCEDURE — 36415 COLL VENOUS BLD VENIPUNCTURE: CPT | Mod: ,,, | Performed by: OBSTETRICS & GYNECOLOGY

## 2021-05-04 PROCEDURE — 85025 CBC WITH DIFFERENTIAL: ICD-10-PCS | Mod: ,,, | Performed by: CLINICAL MEDICAL LABORATORY

## 2021-05-04 PROCEDURE — 36415 COLL VENOUS BLD VENIPUNCTURE: CPT | Performed by: OBSTETRICS & GYNECOLOGY

## 2021-05-06 ENCOUNTER — OFFICE VISIT (OUTPATIENT)
Dept: ORTHOPEDICS | Facility: CLINIC | Age: 66
End: 2021-05-06
Payer: MEDICARE

## 2021-05-06 DIAGNOSIS — M25.511 RIGHT SHOULDER PAIN: Primary | ICD-10-CM

## 2021-05-06 DIAGNOSIS — S46.011D TRAUMATIC COMPLETE TEAR OF RIGHT ROTATOR CUFF, SUBSEQUENT ENCOUNTER: Primary | ICD-10-CM

## 2021-05-06 PROCEDURE — 99213 OFFICE O/P EST LOW 20 MIN: CPT | Mod: ,,, | Performed by: ORTHOPAEDIC SURGERY

## 2021-05-06 PROCEDURE — 99213 PR OFFICE/OUTPT VISIT, EST, LEVL III, 20-29 MIN: ICD-10-PCS | Mod: ,,, | Performed by: ORTHOPAEDIC SURGERY

## 2021-05-12 ENCOUNTER — CLINICAL SUPPORT (OUTPATIENT)
Dept: REHABILITATION | Facility: HOSPITAL | Age: 66
End: 2021-05-12
Attending: ORTHOPAEDIC SURGERY
Payer: MEDICARE

## 2021-05-12 DIAGNOSIS — M12.811 ROTATOR CUFF TEAR ARTHROPATHY OF RIGHT SHOULDER: ICD-10-CM

## 2021-05-12 DIAGNOSIS — M25.511 RIGHT SHOULDER PAIN: ICD-10-CM

## 2021-05-12 DIAGNOSIS — R29.898 WEAKNESS OF RIGHT ARM: ICD-10-CM

## 2021-05-12 DIAGNOSIS — M25.511 ACUTE PAIN OF RIGHT SHOULDER: ICD-10-CM

## 2021-05-12 DIAGNOSIS — M75.101 ROTATOR CUFF TEAR ARTHROPATHY OF RIGHT SHOULDER: ICD-10-CM

## 2021-05-12 PROCEDURE — 97161 PT EVAL LOW COMPLEX 20 MIN: CPT

## 2021-05-12 PROCEDURE — 97110 THERAPEUTIC EXERCISES: CPT

## 2021-05-19 ENCOUNTER — CLINICAL SUPPORT (OUTPATIENT)
Dept: REHABILITATION | Facility: HOSPITAL | Age: 66
End: 2021-05-19
Attending: ORTHOPAEDIC SURGERY
Payer: MEDICARE

## 2021-05-19 DIAGNOSIS — M75.101 ROTATOR CUFF TEAR ARTHROPATHY OF RIGHT SHOULDER: Primary | ICD-10-CM

## 2021-05-19 DIAGNOSIS — M12.811 ROTATOR CUFF TEAR ARTHROPATHY OF RIGHT SHOULDER: Primary | ICD-10-CM

## 2021-05-19 DIAGNOSIS — R29.898 WEAKNESS OF RIGHT ARM: ICD-10-CM

## 2021-05-19 PROCEDURE — 97140 MANUAL THERAPY 1/> REGIONS: CPT | Mod: CQ

## 2021-05-19 PROCEDURE — 97110 THERAPEUTIC EXERCISES: CPT | Mod: CQ

## 2021-05-21 ENCOUNTER — CLINICAL SUPPORT (OUTPATIENT)
Dept: REHABILITATION | Facility: HOSPITAL | Age: 66
End: 2021-05-21
Attending: ORTHOPAEDIC SURGERY
Payer: MEDICARE

## 2021-05-21 DIAGNOSIS — M75.101 ROTATOR CUFF TEAR ARTHROPATHY OF RIGHT SHOULDER: ICD-10-CM

## 2021-05-21 DIAGNOSIS — M12.811 ROTATOR CUFF TEAR ARTHROPATHY OF RIGHT SHOULDER: ICD-10-CM

## 2021-05-21 PROCEDURE — 97110 THERAPEUTIC EXERCISES: CPT

## 2021-05-21 PROCEDURE — 97014 ELECTRIC STIMULATION THERAPY: CPT

## 2021-05-21 PROCEDURE — 97140 MANUAL THERAPY 1/> REGIONS: CPT

## 2021-05-25 ENCOUNTER — CLINICAL SUPPORT (OUTPATIENT)
Dept: REHABILITATION | Facility: HOSPITAL | Age: 66
End: 2021-05-25
Attending: ORTHOPAEDIC SURGERY
Payer: MEDICARE

## 2021-05-25 DIAGNOSIS — M75.101 ROTATOR CUFF TEAR ARTHROPATHY OF RIGHT SHOULDER: ICD-10-CM

## 2021-05-25 DIAGNOSIS — M12.811 ROTATOR CUFF TEAR ARTHROPATHY OF RIGHT SHOULDER: ICD-10-CM

## 2021-05-25 PROCEDURE — 97014 ELECTRIC STIMULATION THERAPY: CPT

## 2021-05-25 PROCEDURE — 97140 MANUAL THERAPY 1/> REGIONS: CPT

## 2021-05-25 PROCEDURE — 97110 THERAPEUTIC EXERCISES: CPT

## 2021-06-02 ENCOUNTER — CLINICAL SUPPORT (OUTPATIENT)
Dept: REHABILITATION | Facility: HOSPITAL | Age: 66
End: 2021-06-02
Attending: ORTHOPAEDIC SURGERY
Payer: MEDICARE

## 2021-06-02 DIAGNOSIS — M75.101 ROTATOR CUFF TEAR ARTHROPATHY OF RIGHT SHOULDER: ICD-10-CM

## 2021-06-02 DIAGNOSIS — M12.811 ROTATOR CUFF TEAR ARTHROPATHY OF RIGHT SHOULDER: ICD-10-CM

## 2021-06-02 PROCEDURE — 97140 MANUAL THERAPY 1/> REGIONS: CPT

## 2021-06-02 PROCEDURE — 97110 THERAPEUTIC EXERCISES: CPT

## 2021-06-02 PROCEDURE — 97014 ELECTRIC STIMULATION THERAPY: CPT

## 2021-06-04 ENCOUNTER — CLINICAL SUPPORT (OUTPATIENT)
Dept: REHABILITATION | Facility: HOSPITAL | Age: 66
End: 2021-06-04
Attending: ORTHOPAEDIC SURGERY
Payer: MEDICARE

## 2021-06-04 DIAGNOSIS — M75.101 ROTATOR CUFF TEAR ARTHROPATHY OF RIGHT SHOULDER: ICD-10-CM

## 2021-06-04 DIAGNOSIS — M12.811 ROTATOR CUFF TEAR ARTHROPATHY OF RIGHT SHOULDER: ICD-10-CM

## 2021-06-04 PROCEDURE — 97140 MANUAL THERAPY 1/> REGIONS: CPT

## 2021-06-04 PROCEDURE — 97110 THERAPEUTIC EXERCISES: CPT

## 2021-06-09 ENCOUNTER — CLINICAL SUPPORT (OUTPATIENT)
Dept: REHABILITATION | Facility: HOSPITAL | Age: 66
End: 2021-06-09
Attending: ORTHOPAEDIC SURGERY
Payer: MEDICARE

## 2021-06-09 DIAGNOSIS — M12.811 ROTATOR CUFF TEAR ARTHROPATHY OF RIGHT SHOULDER: ICD-10-CM

## 2021-06-09 DIAGNOSIS — M75.101 ROTATOR CUFF TEAR ARTHROPATHY OF RIGHT SHOULDER: ICD-10-CM

## 2021-06-09 PROCEDURE — 97140 MANUAL THERAPY 1/> REGIONS: CPT

## 2021-06-09 PROCEDURE — 97110 THERAPEUTIC EXERCISES: CPT

## 2021-06-17 ENCOUNTER — CLINICAL SUPPORT (OUTPATIENT)
Dept: REHABILITATION | Facility: HOSPITAL | Age: 66
End: 2021-06-17
Attending: ORTHOPAEDIC SURGERY
Payer: MEDICARE

## 2021-06-17 DIAGNOSIS — M75.101 ROTATOR CUFF TEAR ARTHROPATHY OF RIGHT SHOULDER: ICD-10-CM

## 2021-06-17 DIAGNOSIS — M12.811 ROTATOR CUFF TEAR ARTHROPATHY OF RIGHT SHOULDER: ICD-10-CM

## 2021-06-17 PROCEDURE — 97110 THERAPEUTIC EXERCISES: CPT

## 2021-06-22 ENCOUNTER — CLINICAL SUPPORT (OUTPATIENT)
Dept: REHABILITATION | Facility: HOSPITAL | Age: 66
End: 2021-06-22
Attending: ORTHOPAEDIC SURGERY
Payer: MEDICARE

## 2021-06-22 DIAGNOSIS — M75.101 ROTATOR CUFF TEAR ARTHROPATHY OF RIGHT SHOULDER: ICD-10-CM

## 2021-06-22 DIAGNOSIS — M12.811 ROTATOR CUFF TEAR ARTHROPATHY OF RIGHT SHOULDER: ICD-10-CM

## 2021-06-22 PROCEDURE — 97110 THERAPEUTIC EXERCISES: CPT

## 2021-07-15 ENCOUNTER — OFFICE VISIT (OUTPATIENT)
Dept: ORTHOPEDICS | Facility: CLINIC | Age: 66
End: 2021-07-15
Payer: MEDICARE

## 2021-07-15 VITALS — HEIGHT: 61 IN | BODY MASS INDEX: 21.9 KG/M2 | WEIGHT: 116 LBS

## 2021-07-15 DIAGNOSIS — S46.011D TRAUMATIC COMPLETE TEAR OF RIGHT ROTATOR CUFF, SUBSEQUENT ENCOUNTER: Primary | ICD-10-CM

## 2021-07-15 PROCEDURE — 99212 OFFICE O/P EST SF 10 MIN: CPT | Mod: ,,, | Performed by: ORTHOPAEDIC SURGERY

## 2021-07-15 PROCEDURE — 99212 PR OFFICE/OUTPT VISIT, EST, LEVL II, 10-19 MIN: ICD-10-PCS | Mod: ,,, | Performed by: ORTHOPAEDIC SURGERY

## 2021-10-20 ENCOUNTER — OFFICE VISIT (OUTPATIENT)
Dept: FAMILY MEDICINE | Facility: CLINIC | Age: 66
End: 2021-10-20
Payer: MEDICARE

## 2021-10-20 VITALS
HEIGHT: 61 IN | TEMPERATURE: 98 F | DIASTOLIC BLOOD PRESSURE: 65 MMHG | HEART RATE: 74 BPM | WEIGHT: 124 LBS | OXYGEN SATURATION: 98 % | SYSTOLIC BLOOD PRESSURE: 115 MMHG | RESPIRATION RATE: 18 BRPM | BODY MASS INDEX: 23.41 KG/M2

## 2021-10-20 DIAGNOSIS — Z23 NEED FOR VACCINATION: ICD-10-CM

## 2021-10-20 DIAGNOSIS — I10 PRIMARY HYPERTENSION: Chronic | ICD-10-CM

## 2021-10-20 DIAGNOSIS — F41.9 ANXIETY: Chronic | ICD-10-CM

## 2021-10-20 DIAGNOSIS — E78.00 PURE HYPERCHOLESTEROLEMIA: Chronic | ICD-10-CM

## 2021-10-20 DIAGNOSIS — I10 ESSENTIAL (PRIMARY) HYPERTENSION: Primary | Chronic | ICD-10-CM

## 2021-10-20 DIAGNOSIS — R79.89 LOW TSH LEVEL: ICD-10-CM

## 2021-10-20 DIAGNOSIS — N32.81 OAB (OVERACTIVE BLADDER): Chronic | ICD-10-CM

## 2021-10-20 LAB
ALBUMIN SERPL BCP-MCNC: 3.7 G/DL (ref 3.5–5)
ALBUMIN/GLOB SERPL: 1.3 {RATIO}
ALP SERPL-CCNC: 96 U/L (ref 55–142)
ALT SERPL W P-5'-P-CCNC: 22 U/L (ref 13–56)
ANION GAP SERPL CALCULATED.3IONS-SCNC: 6 MMOL/L (ref 7–16)
AST SERPL W P-5'-P-CCNC: 16 U/L (ref 15–37)
BASOPHILS # BLD AUTO: 0.04 K/UL (ref 0–0.2)
BASOPHILS NFR BLD AUTO: 0.7 % (ref 0–1)
BILIRUB SERPL-MCNC: 0.7 MG/DL (ref 0–1.2)
BUN SERPL-MCNC: 23 MG/DL (ref 7–18)
BUN/CREAT SERPL: 21 (ref 6–20)
CALCIUM SERPL-MCNC: 8.9 MG/DL (ref 8.5–10.1)
CHLORIDE SERPL-SCNC: 106 MMOL/L (ref 98–107)
CHOLEST SERPL-MCNC: 159 MG/DL (ref 0–200)
CHOLEST/HDLC SERPL: 2.4 {RATIO}
CO2 SERPL-SCNC: 30 MMOL/L (ref 21–32)
CREAT SERPL-MCNC: 1.1 MG/DL (ref 0.55–1.02)
DIFFERENTIAL METHOD BLD: ABNORMAL
EOSINOPHIL # BLD AUTO: 0.25 K/UL (ref 0–0.5)
EOSINOPHIL NFR BLD AUTO: 4.3 % (ref 1–4)
ERYTHROCYTE [DISTWIDTH] IN BLOOD BY AUTOMATED COUNT: 11.9 % (ref 11.5–14.5)
GLOBULIN SER-MCNC: 2.9 G/DL (ref 2–4)
GLUCOSE SERPL-MCNC: 78 MG/DL (ref 74–106)
HCT VFR BLD AUTO: 39 % (ref 38–47)
HDLC SERPL-MCNC: 67 MG/DL (ref 40–60)
HGB BLD-MCNC: 13 G/DL (ref 12–16)
IMM GRANULOCYTES # BLD AUTO: 0.02 K/UL (ref 0–0.04)
IMM GRANULOCYTES NFR BLD: 0.3 % (ref 0–0.4)
LDLC SERPL CALC-MCNC: 75 MG/DL
LDLC/HDLC SERPL: 1.1 {RATIO}
LYMPHOCYTES # BLD AUTO: 1.4 K/UL (ref 1–4.8)
LYMPHOCYTES NFR BLD AUTO: 24.1 % (ref 27–41)
MCH RBC QN AUTO: 29.8 PG (ref 27–31)
MCHC RBC AUTO-ENTMCNC: 33.3 G/DL (ref 32–36)
MCV RBC AUTO: 89.4 FL (ref 80–96)
MONOCYTES # BLD AUTO: 0.58 K/UL (ref 0–0.8)
MONOCYTES NFR BLD AUTO: 10 % (ref 2–6)
MPC BLD CALC-MCNC: 10.4 FL (ref 9.4–12.4)
NEUTROPHILS # BLD AUTO: 3.53 K/UL (ref 1.8–7.7)
NEUTROPHILS NFR BLD AUTO: 60.6 % (ref 53–65)
NONHDLC SERPL-MCNC: 92 MG/DL
NRBC # BLD AUTO: 0 X10E3/UL
NRBC, AUTO (.00): 0 %
PLATELET # BLD AUTO: 251 K/UL (ref 150–400)
POTASSIUM SERPL-SCNC: 4 MMOL/L (ref 3.5–5.1)
PROT SERPL-MCNC: 6.6 G/DL (ref 6.4–8.2)
RBC # BLD AUTO: 4.36 M/UL (ref 4.2–5.4)
SODIUM SERPL-SCNC: 138 MMOL/L (ref 136–145)
TRIGL SERPL-MCNC: 83 MG/DL (ref 35–150)
TSH SERPL DL<=0.005 MIU/L-ACNC: 0.11 UIU/ML (ref 0.36–3.74)
VLDLC SERPL-MCNC: 17 MG/DL
WBC # BLD AUTO: 5.82 K/UL (ref 4.5–11)

## 2021-10-20 PROCEDURE — 90662 FLU VACCINE - QUADRIVALENT - HIGH DOSE (65+) PRESERVATIVE FREE IM: ICD-10-PCS | Mod: ,,, | Performed by: FAMILY MEDICINE

## 2021-10-20 PROCEDURE — 85025 COMPLETE CBC W/AUTO DIFF WBC: CPT | Mod: ,,, | Performed by: CLINICAL MEDICAL LABORATORY

## 2021-10-20 PROCEDURE — 80061 LIPID PANEL: ICD-10-PCS | Mod: ,,, | Performed by: CLINICAL MEDICAL LABORATORY

## 2021-10-20 PROCEDURE — 90670 PCV13 VACCINE IM: CPT | Mod: ,,, | Performed by: FAMILY MEDICINE

## 2021-10-20 PROCEDURE — G0009 ADMIN PNEUMOCOCCAL VACCINE: HCPCS | Mod: ,,, | Performed by: FAMILY MEDICINE

## 2021-10-20 PROCEDURE — 85025 CBC WITH DIFFERENTIAL: ICD-10-PCS | Mod: ,,, | Performed by: CLINICAL MEDICAL LABORATORY

## 2021-10-20 PROCEDURE — 80053 COMPREHENSIVE METABOLIC PANEL: ICD-10-PCS | Mod: ,,, | Performed by: CLINICAL MEDICAL LABORATORY

## 2021-10-20 PROCEDURE — 99214 OFFICE O/P EST MOD 30 MIN: CPT | Mod: ,,, | Performed by: FAMILY MEDICINE

## 2021-10-20 PROCEDURE — 90670 PNEUMOCOCCAL CONJUGATE VACCINE 13-VALENT LESS THAN 5YO & GREATER THAN: ICD-10-PCS | Mod: ,,, | Performed by: FAMILY MEDICINE

## 2021-10-20 PROCEDURE — 84443 ASSAY THYROID STIM HORMONE: CPT | Mod: ,,, | Performed by: CLINICAL MEDICAL LABORATORY

## 2021-10-20 PROCEDURE — G0008 FLU VACCINE - QUADRIVALENT - HIGH DOSE (65+) PRESERVATIVE FREE IM: ICD-10-PCS | Mod: ,,, | Performed by: FAMILY MEDICINE

## 2021-10-20 PROCEDURE — 99214 PR OFFICE/OUTPT VISIT, EST, LEVL IV, 30-39 MIN: ICD-10-PCS | Mod: ,,, | Performed by: FAMILY MEDICINE

## 2021-10-20 PROCEDURE — 80053 COMPREHEN METABOLIC PANEL: CPT | Mod: ,,, | Performed by: CLINICAL MEDICAL LABORATORY

## 2021-10-20 PROCEDURE — 84443 TSH: ICD-10-PCS | Mod: ,,, | Performed by: CLINICAL MEDICAL LABORATORY

## 2021-10-20 PROCEDURE — G0008 ADMIN INFLUENZA VIRUS VAC: HCPCS | Mod: ,,, | Performed by: FAMILY MEDICINE

## 2021-10-20 PROCEDURE — 80061 LIPID PANEL: CPT | Mod: ,,, | Performed by: CLINICAL MEDICAL LABORATORY

## 2021-10-20 PROCEDURE — 90662 IIV NO PRSV INCREASED AG IM: CPT | Mod: ,,, | Performed by: FAMILY MEDICINE

## 2021-10-20 PROCEDURE — G0009 PNEUMOCOCCAL CONJUGATE VACCINE 13-VALENT LESS THAN 5YO & GREATER THAN: ICD-10-PCS | Mod: ,,, | Performed by: FAMILY MEDICINE

## 2021-10-20 RX ORDER — TOLTERODINE 4 MG/1
4 CAPSULE, EXTENDED RELEASE ORAL DAILY
Qty: 30 CAPSULE | Refills: 11 | Status: SHIPPED | OUTPATIENT
Start: 2021-10-20 | End: 2022-07-22 | Stop reason: SDUPTHER

## 2021-10-20 RX ORDER — GABAPENTIN 100 MG/1
100 CAPSULE ORAL 3 TIMES DAILY
Qty: 90 CAPSULE | Refills: 11 | Status: SHIPPED | OUTPATIENT
Start: 2021-10-20 | End: 2022-07-22

## 2021-10-20 RX ORDER — LATANOPROST 50 UG/ML
1 SOLUTION/ DROPS OPHTHALMIC NIGHTLY
COMMUNITY

## 2021-10-21 ENCOUNTER — PATIENT MESSAGE (OUTPATIENT)
Dept: FAMILY MEDICINE | Facility: CLINIC | Age: 66
End: 2021-10-21
Payer: MEDICARE

## 2021-10-22 ENCOUNTER — PATIENT MESSAGE (OUTPATIENT)
Dept: FAMILY MEDICINE | Facility: CLINIC | Age: 66
End: 2021-10-22
Payer: MEDICARE

## 2022-07-22 ENCOUNTER — OFFICE VISIT (OUTPATIENT)
Dept: FAMILY MEDICINE | Facility: CLINIC | Age: 67
End: 2022-07-22
Payer: MEDICARE

## 2022-07-22 VITALS
OXYGEN SATURATION: 98 % | WEIGHT: 124 LBS | SYSTOLIC BLOOD PRESSURE: 120 MMHG | BODY MASS INDEX: 23.41 KG/M2 | DIASTOLIC BLOOD PRESSURE: 78 MMHG | TEMPERATURE: 98 F | RESPIRATION RATE: 16 BRPM | HEIGHT: 61 IN | HEART RATE: 69 BPM

## 2022-07-22 DIAGNOSIS — F41.9 ANXIETY: Chronic | ICD-10-CM

## 2022-07-22 DIAGNOSIS — E78.00 PURE HYPERCHOLESTEROLEMIA: Chronic | ICD-10-CM

## 2022-07-22 DIAGNOSIS — N32.81 OAB (OVERACTIVE BLADDER): Chronic | ICD-10-CM

## 2022-07-22 DIAGNOSIS — E55.9 VITAMIN D DEFICIENCY: Chronic | ICD-10-CM

## 2022-07-22 DIAGNOSIS — M25.511 ACUTE PAIN OF RIGHT SHOULDER: ICD-10-CM

## 2022-07-22 DIAGNOSIS — R79.89 LOW TSH LEVEL: Primary | ICD-10-CM

## 2022-07-22 DIAGNOSIS — I10 PRIMARY HYPERTENSION: Chronic | ICD-10-CM

## 2022-07-22 DIAGNOSIS — M26.609 TMJ (TEMPOROMANDIBULAR JOINT DISORDER): ICD-10-CM

## 2022-07-22 DIAGNOSIS — Z12.11 SCREENING FOR MALIGNANT NEOPLASM OF COLON: ICD-10-CM

## 2022-07-22 LAB
T4 FREE SERPL-MCNC: 1.02 NG/DL (ref 0.76–1.46)
TSH SERPL DL<=0.005 MIU/L-ACNC: 0.69 UIU/ML (ref 0.36–3.74)

## 2022-07-22 PROCEDURE — 84443 TSH: ICD-10-PCS | Mod: ,,, | Performed by: CLINICAL MEDICAL LABORATORY

## 2022-07-22 PROCEDURE — 84443 ASSAY THYROID STIM HORMONE: CPT | Mod: ,,, | Performed by: CLINICAL MEDICAL LABORATORY

## 2022-07-22 PROCEDURE — 99214 OFFICE O/P EST MOD 30 MIN: CPT | Mod: ,,, | Performed by: FAMILY MEDICINE

## 2022-07-22 PROCEDURE — 84439 T4, FREE: ICD-10-PCS | Mod: ,,, | Performed by: CLINICAL MEDICAL LABORATORY

## 2022-07-22 PROCEDURE — 84439 ASSAY OF FREE THYROXINE: CPT | Mod: ,,, | Performed by: CLINICAL MEDICAL LABORATORY

## 2022-07-22 PROCEDURE — 99214 PR OFFICE/OUTPT VISIT, EST, LEVL IV, 30-39 MIN: ICD-10-PCS | Mod: ,,, | Performed by: FAMILY MEDICINE

## 2022-07-22 RX ORDER — LISINOPRIL 20 MG/1
20 TABLET ORAL DAILY
Qty: 90 TABLET | Refills: 3 | Status: SHIPPED | OUTPATIENT
Start: 2022-07-22 | End: 2023-06-30

## 2022-07-22 RX ORDER — ROSUVASTATIN CALCIUM 10 MG/1
10 TABLET, COATED ORAL DAILY
Qty: 90 TABLET | Refills: 3 | Status: SHIPPED | OUTPATIENT
Start: 2022-07-22 | End: 2023-07-14

## 2022-07-22 RX ORDER — CITALOPRAM 40 MG/1
40 TABLET, FILM COATED ORAL DAILY
Qty: 90 TABLET | Refills: 3 | Status: SHIPPED | OUTPATIENT
Start: 2022-07-22 | End: 2023-10-20 | Stop reason: DRUGHIGH

## 2022-07-22 RX ORDER — TOLTERODINE 4 MG/1
4 CAPSULE, EXTENDED RELEASE ORAL DAILY
Qty: 90 CAPSULE | Refills: 3 | Status: SHIPPED | OUTPATIENT
Start: 2022-07-22 | End: 2023-10-20 | Stop reason: SDUPTHER

## 2022-07-22 RX ORDER — MELOXICAM 15 MG/1
15 TABLET ORAL DAILY PRN
Qty: 90 TABLET | Refills: 3 | Status: SHIPPED | OUTPATIENT
Start: 2022-07-22 | End: 2023-09-08

## 2022-07-26 ENCOUNTER — PATIENT MESSAGE (OUTPATIENT)
Dept: FAMILY MEDICINE | Facility: CLINIC | Age: 67
End: 2022-07-26
Payer: MEDICARE

## 2022-07-26 PROBLEM — F41.9 ANXIETY: Chronic | Status: ACTIVE | Noted: 2022-07-26

## 2022-07-26 PROBLEM — E55.9 VITAMIN D DEFICIENCY: Chronic | Status: ACTIVE | Noted: 2022-07-26

## 2022-07-26 PROBLEM — E78.00 PURE HYPERCHOLESTEROLEMIA: Chronic | Status: ACTIVE | Noted: 2022-07-26

## 2022-07-26 PROBLEM — R79.89 LOW TSH LEVEL: Status: ACTIVE | Noted: 2022-07-26

## 2022-07-26 NOTE — PROGRESS NOTES
Answers for HPI/ROS submitted by the patient on 7/18/2022  activity change: No  unexpected weight change: No  neck pain: No  hearing loss: No  rhinorrhea: No  trouble swallowing: No  eye discharge: No  visual disturbance: No  chest tightness: No  wheezing: No  chest pain: No  palpitations: No  blood in stool: No  constipation: No  vomiting: No  diarrhea: No  polydipsia: No  polyuria: No  difficulty urinating: No  hematuria: No  menstrual problem: No  dysuria: No  joint swelling: No  arthralgias: No  headaches: No  weakness: No  confusion: No  dysphoric mood: No

## 2022-07-26 NOTE — PROGRESS NOTES
"Subjective:       Patient ID: Madeleine Koch is a 67 y.o. female.    Chief Complaint: Check up and Medication Refill    66 yo WF here to recheck thyroid and to get meds changed to mail order pharmacy.     Review of Systems   Constitutional: Negative for activity change and unexpected weight change.   HENT: Negative for hearing loss, rhinorrhea and trouble swallowing.    Eyes: Negative for discharge and visual disturbance.   Respiratory: Negative for chest tightness, shortness of breath and wheezing.    Cardiovascular: Negative for chest pain and palpitations.   Gastrointestinal: Negative for abdominal pain, blood in stool, constipation, diarrhea and vomiting.   Endocrine: Negative for polydipsia and polyuria.   Genitourinary: Negative for difficulty urinating, dysuria, hematuria and menstrual problem.   Musculoskeletal: Negative for arthralgias, joint swelling and neck pain.   Neurological: Negative for weakness and headaches.   Psychiatric/Behavioral: Negative for confusion and dysphoric mood.         Office Visit on 07/22/2022   Component Date Value Ref Range Status    Free T4 07/22/2022 1.02  0.76 - 1.46 ng/dL Final    TSH 07/22/2022 0.688  0.358 - 3.740 uIU/mL Final       Objective:     Blood pressure 120/78, pulse 69, temperature 97.9 °F (36.6 °C), temperature source Oral, resp. rate 16, height 5' 1" (1.549 m), weight 56.2 kg (124 lb), SpO2 98 %.      Physical Exam  Constitutional:       Appearance: Normal appearance.   HENT:      Head: Normocephalic and atraumatic.      Right Ear: External ear normal.      Left Ear: External ear normal.      Nose: Nose normal.      Mouth/Throat:      Mouth: Mucous membranes are moist.   Eyes:      Conjunctiva/sclera: Conjunctivae normal.      Pupils: Pupils are equal, round, and reactive to light.   Cardiovascular:      Rate and Rhythm: Normal rate and regular rhythm.      Pulses: Normal pulses.      Heart sounds: Normal heart sounds.   Pulmonary:      Effort: Pulmonary " effort is normal.      Breath sounds: Normal breath sounds.   Abdominal:      General: Abdomen is flat.      Palpations: Abdomen is soft.   Musculoskeletal:         General: Normal range of motion.      Cervical back: Normal range of motion and neck supple.   Skin:     General: Skin is warm and dry.   Neurological:      General: No focal deficit present.      Mental Status: She is alert and oriented to person, place, and time.   Psychiatric:         Mood and Affect: Mood normal.         Behavior: Behavior normal.         Thought Content: Thought content normal.         Judgment: Judgment normal.         Assessment:       Problem List Items Addressed This Visit        Psychiatric    Anxiety (Chronic)    Relevant Medications    citalopram (CELEXA) 40 MG tablet       ENT    TMJ (temporomandibular joint disorder)    Relevant Medications    meloxicam (MOBIC) 15 MG tablet       Cardiac/Vascular    Pure hypercholesterolemia (Chronic)    Relevant Medications    rosuvastatin (CRESTOR) 10 MG tablet    Other Relevant Orders    Urinalysis, Reflex to Urine Culture    Lipid Panel    CBC Auto Differential    Comprehensive Metabolic Panel    TSH    Primary hypertension    Relevant Medications    lisinopriL (PRINIVIL,ZESTRIL) 20 MG tablet    Other Relevant Orders    Urinalysis, Reflex to Urine Culture    Lipid Panel    CBC Auto Differential    Comprehensive Metabolic Panel    TSH    Vitamin D       Renal/    OAB (overactive bladder)    Relevant Medications    tolterodine (DETROL LA) 4 MG 24 hr capsule       Endocrine    Vitamin D deficiency (Chronic)    Relevant Orders    Vitamin D    Low TSH level - Primary    Relevant Orders    Urinalysis, Reflex to Urine Culture    Lipid Panel    CBC Auto Differential    Comprehensive Metabolic Panel    TSH       Orthopedic    Right shoulder pain    Relevant Medications    meloxicam (MOBIC) 15 MG tablet      Other Visit Diagnoses     Screening for malignant neoplasm of colon        Relevant  Orders    Ambulatory referral/consult to Gastroenterology          Plan:       RTC 3 months with all lab and vaccines.

## 2022-07-28 ENCOUNTER — PATIENT MESSAGE (OUTPATIENT)
Dept: FAMILY MEDICINE | Facility: CLINIC | Age: 67
End: 2022-07-28
Payer: MEDICARE

## 2022-08-09 DIAGNOSIS — Z71.89 COMPLEX CARE COORDINATION: ICD-10-CM

## 2023-03-09 DIAGNOSIS — Z71.89 COMPLEX CARE COORDINATION: ICD-10-CM

## 2023-04-28 ENCOUNTER — PATIENT MESSAGE (OUTPATIENT)
Dept: ADMINISTRATIVE | Facility: HOSPITAL | Age: 68
End: 2023-04-28

## 2023-06-30 DIAGNOSIS — I10 PRIMARY HYPERTENSION: Chronic | ICD-10-CM

## 2023-06-30 RX ORDER — LISINOPRIL 20 MG/1
TABLET ORAL
Qty: 90 TABLET | Refills: 3 | Status: SHIPPED | OUTPATIENT
Start: 2023-06-30

## 2023-07-12 DIAGNOSIS — E78.00 PURE HYPERCHOLESTEROLEMIA: Chronic | ICD-10-CM

## 2023-07-14 RX ORDER — ROSUVASTATIN CALCIUM 10 MG/1
TABLET, COATED ORAL
Qty: 90 TABLET | Refills: 3 | Status: SHIPPED | OUTPATIENT
Start: 2023-07-14

## 2023-07-19 ENCOUNTER — TELEPHONE (OUTPATIENT)
Dept: ORTHOPEDICS | Facility: CLINIC | Age: 68
End: 2023-07-19
Payer: MEDICARE

## 2023-07-19 DIAGNOSIS — M25.552 LEFT HIP PAIN: Primary | ICD-10-CM

## 2023-07-19 NOTE — TELEPHONE ENCOUNTER
Appointment scheduled with Dr. Patel for tomorrow (7/20)    ----- Message from Halina Brown sent at 7/19/2023  3:53 PM CDT -----  Regarding: yellow priority Talk to a nurse or provider within 15 minutes.  Symptoms:  Left Hip Pain - Not From Injury,Left  Leg Pain - Not From Injury  Outcome: Talk to a nurse or provider within 15 minutes.  Reason: Severe pain now    The caller accepted this outcome  Please call her @ [phone]

## 2023-07-20 ENCOUNTER — HOSPITAL ENCOUNTER (OUTPATIENT)
Dept: RADIOLOGY | Facility: HOSPITAL | Age: 68
Discharge: HOME | End: 2023-07-20
Attending: ORTHOPAEDIC SURGERY
Payer: MEDICARE

## 2023-07-20 ENCOUNTER — OFFICE VISIT (OUTPATIENT)
Dept: ORTHOPEDICS | Facility: CLINIC | Age: 68
End: 2023-07-20
Payer: MEDICARE

## 2023-07-20 DIAGNOSIS — M25.552 LEFT HIP PAIN: ICD-10-CM

## 2023-07-20 DIAGNOSIS — M25.552 PAIN OF LEFT HIP: ICD-10-CM

## 2023-07-20 DIAGNOSIS — M25.852 FEMOROACETABULAR IMPINGEMENT OF LEFT HIP: Primary | ICD-10-CM

## 2023-07-20 PROCEDURE — 99214 PR OFFICE/OUTPT VISIT, EST, LEVL IV, 30-39 MIN: ICD-10-PCS | Mod: S$PBB,,, | Performed by: ORTHOPAEDIC SURGERY

## 2023-07-20 PROCEDURE — 99214 OFFICE O/P EST MOD 30 MIN: CPT | Mod: S$PBB,,, | Performed by: ORTHOPAEDIC SURGERY

## 2023-07-20 PROCEDURE — 73502 X-RAY EXAM HIP UNI 2-3 VIEWS: CPT | Mod: 26,LT,, | Performed by: ORTHOPAEDIC SURGERY

## 2023-07-20 PROCEDURE — 99213 OFFICE O/P EST LOW 20 MIN: CPT | Mod: PBBFAC | Performed by: ORTHOPAEDIC SURGERY

## 2023-07-20 PROCEDURE — 73502 X-RAY EXAM HIP UNI 2-3 VIEWS: CPT | Mod: TC,LT

## 2023-07-20 PROCEDURE — 73502 XR HIP WITH PELVIS WHEN PERFORMED, 2 OR 3 VIEWS LEFT: ICD-10-PCS | Mod: 26,LT,, | Performed by: ORTHOPAEDIC SURGERY

## 2023-07-20 RX ORDER — METHYLPREDNISOLONE 4 MG/1
TABLET ORAL
Qty: 21 EACH | Refills: 0 | Status: SHIPPED | OUTPATIENT
Start: 2023-07-20 | End: 2023-08-10

## 2023-07-20 RX ORDER — CYCLOBENZAPRINE HCL 10 MG
10 TABLET ORAL 3 TIMES DAILY PRN
Qty: 45 TABLET | Refills: 3 | Status: SHIPPED | OUTPATIENT
Start: 2023-07-20 | End: 2023-07-30

## 2023-07-20 RX ORDER — GABAPENTIN 100 MG/1
100 CAPSULE ORAL 3 TIMES DAILY
COMMUNITY
End: 2023-08-07 | Stop reason: SDUPTHER

## 2023-07-20 NOTE — PROGRESS NOTES
ASSESSMENT:      ICD-10-CM ICD-9-CM   1. Femoroacetabular impingement of left hip  M25.852 719.95   2. Pain of left hip  M25.552 719.45       PLAN:     -Findings and treatment options were discussed with the patient  -All questions answered  Natural history and expected course discussed. Questions answered.  Educational materials distributed.  Home exercises discussed.  Signs and symptoms very consistent with femoral acetabular impingement.  Concerned about a acute worsening tear of her left hip labrum.  Recommend MRI and see her back once MRI is complete     There could be some associated radiculopathy as well as she does have lumbar spine osteoarthritis but her exam really does appear to be localized along the left anterior hip.  I am going to prescribe Flexeril and a steroid Dosepak as well to help with her pain.  There are no Patient Instructions on file for this visit.    IMAGING:  X-Ray Hip 2 or 3 views Left (with Pelvis when performed)    Result Date: 7/20/2023  See Procedure Notes for results. IMPRESSION: Please see Ortho procedure notes for report.  This procedure was auto-finalized by: Virtual Radiologist   Three views left hip were obtained today demonstrating presence of a positive ischial spine sign as well as loss of the femoral head neck offset as well as a positive cam lesion greater than 55° at the left hip femoral head neck junction        CC: Hip pain  68 y.o. Female who presents as a new patient to me for evaluation of left hip pain.  She was moving furniture this weekend when she began having pain in her left hip and down her left leg after getting up from resting. She has had previous back surgery and does describe some nerve pain in her leg. She states that she still has some numbness in her leg but that is better. She does also have pain in her left groin.  Pain has been present for 4 days.  Mechanism of injury:   Location of the pain: groin and buttock  Occupation: retired  Mechanical  symptoms, such as catching and popping of the hip are not present.    Symptoms are worsened with activity.  Better with rest. Treatment thus far has included rest, activity modifications, and oral medications.    she has not  had formal physical therapy  she has not had previous advanced imaging such as MRI.   she has not  had previous hip injections.   she has  had previous hip or back surgery.   Here today to discuss diagnosis and treatment options.        Review of Systems  All other review of symptoms were reviewed and found to be noncontributory.     PAST MEDICAL HISTORY:   Past Medical History:   Diagnosis Date    Abnormal vaginal bleeding with endometrial thickness greater than 5 mm present on transvaginal ultrasound in postmenopausal patient 10/01/2020    Atrophic vaginitis 10/11/2016    Depression     Dysuria     Essential hypertension     Hyperlipemia     UTI (urinary tract infection)     Yeast vaginitis 10/11/2016       PAST SURGICAL HISTORY:   Past Surgical History:   Procedure Laterality Date    BACK SURGERY  1988    Carbon dioxide vaginal rejuvenation laser within the vagina N/A 05/11/2021    Procedure of 1/3 steps performed today at [facility] for severe atrophic vaginitis/dyspareunia    COLONOSCOPY      DIAGNOSTIC LAPAROSCOPY N/A 04/20/2021    Procedure: LAPAROSCOPY, DIAGNOSTIC;  Surgeon: Dedrick Dunbar MD;  Location: [facility] OR;  Service: OB/GYN;  Laterality: N/A;    EXCISION OF BREAST LESION      EYE SURGERY      5 yrs ago    HYSTEROSCOPY WITH DILATION AND CURETTAGE OF UTERUS N/A 04/20/2021    Procedure: HYSTEROSCOPY, WITH DILATION AND CURETTAGE OF UTERUS;  Surgeon: Dedrick Dunbar MD;  Location: [facility] OR;  Service: OB/GYN;  Laterality: N/A;    Laser rejuvenation carbon dioxide N/A Completion date 07/13/2021    Laser carbon dioxide rejuvenation performed for vaginal contracture atrophic vaginitis-90% improved    SPINE SURGERY  1988       FAMILY HISTORY:   Family History   Problem  Relation Age of Onset    Stroke Paternal Grandfather     Hepatitis Maternal Grandmother     Heart disease Father     Hypertension Father     Hypertension Mother        SOCIAL HISTORY:   Social History     Socioeconomic History    Marital status:    Occupational History    Occupation: retired   Tobacco Use    Smoking status: Former     Packs/day: 1.00     Years: 30.00     Pack years: 30.00     Types: Cigarettes     Quit date: 2005     Years since quittin.8    Smokeless tobacco: Never    Tobacco comments:     Stopped smoking    Substance and Sexual Activity    Alcohol use: Not Currently    Drug use: Never    Sexual activity: Yes     Partners: Male     Birth control/protection: None       MEDICATIONS:     Current Outpatient Medications:     citalopram (CELEXA) 40 MG tablet, Take 1 tablet (40 mg total) by mouth once daily., Disp: 90 tablet, Rfl: 3    gabapentin (NEURONTIN) 100 MG capsule, Take 100 mg by mouth 3 (three) times daily., Disp: , Rfl:     latanoprost 0.005 % ophthalmic solution, Place 1 drop into both eyes every evening., Disp: , Rfl:     lisinopriL (PRINIVIL,ZESTRIL) 20 MG tablet, TAKE 1 TABLET DAILY, Disp: 90 tablet, Rfl: 3    meloxicam (MOBIC) 15 MG tablet, Take 1 tablet (15 mg total) by mouth daily as needed for Pain., Disp: 90 tablet, Rfl: 3    rosuvastatin (CRESTOR) 10 MG tablet, TAKE 1 TABLET DAILY, Disp: 90 tablet, Rfl: 3    tolterodine (DETROL LA) 4 MG 24 hr capsule, Take 1 capsule (4 mg total) by mouth once daily., Disp: 90 capsule, Rfl: 3    cyclobenzaprine (FLEXERIL) 10 MG tablet, Take 1 tablet (10 mg total) by mouth 3 (three) times daily as needed for Muscle spasms., Disp: 45 tablet, Rfl: 3    methylPREDNISolone (MEDROL DOSEPACK) 4 mg tablet, use as directed, Disp: 21 each, Rfl: 0    ALLERGIES:   Review of patient's allergies indicates:  No Known Allergies     PHYSICAL EXAMINATION:  There were no vitals taken for this visit.          Left Hip Exam     Inspection   Swelling:  absent  No deformity of hip.    Range of Motion   Adduction:  normal   Extension:  normal   Flexion:  normal   External rotation:  normal   Internal rotation: normal     Tests   Pain w/ forced internal rotation (APRIL): present  Pain w/ forced external rotation (FADIR): present            Orders Placed This Encounter   Procedures    MRI Hip Without Contrast Left     Standing Status:   Future     Standing Expiration Date:   7/20/2024     Order Specific Question:   Does the patient have a pacemaker or a defibrillator (Note: Some facilities may not be able to schedule an MRI for patients with pacemakers and defibrillators. You should contact your local radiology department to determine if this is the case.)?     Answer:   No     Order Specific Question:   Does the patient have an aneurysm or surgical clip, pump, nerve/brain stimulator, middle/inner ear prosthesis, or other metal implant or foreign object (bullet, shrapnel)? If they have a card related to their implant, ask them to bring it. Issues related to the implant may cause the MRI to be delayed.     Answer:   No     Order Specific Question:   Is the patient claustrophobic?     Answer:   No     Order Specific Question:   Will the patient require sedation?     Answer:   No     Order Specific Question:   Does the patient have any of the following conditions? Diabetes, History of Renal Disease or Hypertension requiring medical therapy?     Answer:   Yes     Order Specific Question:   May the Radiologist modify the order per protocol to meet the clinical needs of the patient?     Answer:   Yes     Order Specific Question:   Is this part of a Research Study?     Answer:   No     Order Specific Question:   Does the patient have on a skin patch for medication with aluminized backing?     Answer:   No     Procedures

## 2023-07-24 ENCOUNTER — TELEPHONE (OUTPATIENT)
Dept: ORTHOPEDICS | Facility: CLINIC | Age: 68
End: 2023-07-24
Payer: MEDICARE

## 2023-07-24 DIAGNOSIS — M25.852 FEMOROACETABULAR IMPINGEMENT OF LEFT HIP: Primary | ICD-10-CM

## 2023-07-24 NOTE — TELEPHONE ENCOUNTER
Left message for patient to call back. Order put in for hip injection. Need to know from patient if and when she wants me to schedule.      ----- Message from Katia Campoverde sent at 7/24/2023  8:12 AM CDT -----    ----- Message -----  From: Dionicio Patel MD  Sent: 7/24/2023   7:49 AM CDT  To: Katia Gilles    Has impingement.  Lets try an intra-articular injection in radiology

## 2023-07-25 ENCOUNTER — TELEPHONE (OUTPATIENT)
Dept: ORTHOPEDICS | Facility: CLINIC | Age: 68
End: 2023-07-25
Payer: MEDICARE

## 2023-07-25 NOTE — TELEPHONE ENCOUNTER
I SPOKE TO THE PATIENT TO LET HER KNOW WE COULD NOT CALL HER IN ANY PAIN MEDICATION AT THIS TIME. SHE IS SCHEDULED FOR A HIP INJECTION ON Monday AND IS GOING TO LET US KNOW IF THAT HELPS HER PAIN.     ----- Message from Karina Rock sent at 7/25/2023  9:51 AM CDT -----  Regarding: refill  Patient call to get something call in for pain at [facility], call back number is [phone]

## 2023-07-28 ENCOUNTER — PATIENT OUTREACH (OUTPATIENT)
Dept: ADMINISTRATIVE | Facility: HOSPITAL | Age: 68
End: 2023-07-28

## 2023-07-28 NOTE — PROGRESS NOTES
Called patient to schedule PCP visit. She has not been seen in clinic by Dr. Caal since July 2022. No answer; left voicemail to return phone call.

## 2023-07-31 ENCOUNTER — HOSPITAL ENCOUNTER (OUTPATIENT)
Dept: RADIOLOGY | Facility: HOSPITAL | Age: 68
Discharge: HOME | End: 2023-07-31
Attending: ORTHOPAEDIC SURGERY
Payer: MEDICARE

## 2023-07-31 DIAGNOSIS — M25.852 FEMOROACETABULAR IMPINGEMENT OF LEFT HIP: ICD-10-CM

## 2023-07-31 PROCEDURE — 27000525 FL ASPIRATION INJECTION MAJOR JOINT LEFT W FLUORO

## 2023-07-31 PROCEDURE — 20610 DRAIN/INJ JOINT/BURSA W/O US: CPT | Mod: LT

## 2023-07-31 RX ORDER — BUPIVACAINE HYDROCHLORIDE AND EPINEPHRINE 5; 5 MG/ML; UG/ML
5 INJECTION, SOLUTION EPIDURAL; INTRACAUDAL; PERINEURAL ONCE
Status: DISCONTINUED | OUTPATIENT
Start: 2023-07-31 | End: 2023-08-01 | Stop reason: HOSPADM

## 2023-07-31 NOTE — PROGRESS NOTES
Left hip steroid injection  Performed by A Millicent MOHSEN  Consent obtained for left hip arthrogram with steroid injection.  A formal timeout was called all staff present agreed to patient and procedure.    The left hip was prepped with ChloraPrep and sterile field was established.  1% lidocaine was used as local anesthetic.  Under fluoroscopic guidance a 22 gauge needle was placed into the left hip joint from an anterior approach.  4 cc of Isovue-300 was injected to confirm intra-articular placement.  4 cc of Marcaine and 40 mg of Kenalog was then injected into the left hip joint.  The needle was removed and the puncture site was cleaned and bandaged.  The patient tolerated the procedure well there were no immediate postprocedure complications.  Total fluoroscopy time was 1 minutes 24 seconds.

## 2023-08-07 ENCOUNTER — OFFICE VISIT (OUTPATIENT)
Dept: ORTHOPEDICS | Facility: CLINIC | Age: 68
End: 2023-08-07
Payer: MEDICARE

## 2023-08-07 ENCOUNTER — HOSPITAL ENCOUNTER (OUTPATIENT)
Dept: RADIOLOGY | Facility: HOSPITAL | Age: 68
Discharge: HOME | End: 2023-08-07
Attending: NURSE PRACTITIONER
Payer: MEDICARE

## 2023-08-07 DIAGNOSIS — M54.16 LUMBAR RADICULOPATHY: ICD-10-CM

## 2023-08-07 DIAGNOSIS — M25.532 LEFT WRIST PAIN: ICD-10-CM

## 2023-08-07 DIAGNOSIS — M25.552 PAIN OF LEFT HIP: Primary | ICD-10-CM

## 2023-08-07 PROCEDURE — 72110 XR LUMBAR SPINE 4-5 VIEW WITH BENDING VIEWS: ICD-10-PCS | Mod: 26,,, | Performed by: RADIOLOGY

## 2023-08-07 PROCEDURE — 72110 X-RAY EXAM L-2 SPINE 4/>VWS: CPT | Mod: 26,,, | Performed by: RADIOLOGY

## 2023-08-07 PROCEDURE — 99213 OFFICE O/P EST LOW 20 MIN: CPT | Mod: PBBFAC | Performed by: NURSE PRACTITIONER

## 2023-08-07 PROCEDURE — 99213 OFFICE O/P EST LOW 20 MIN: CPT | Mod: S$PBB,,, | Performed by: NURSE PRACTITIONER

## 2023-08-07 PROCEDURE — 99213 PR OFFICE/OUTPT VISIT, EST, LEVL III, 20-29 MIN: ICD-10-PCS | Mod: S$PBB,,, | Performed by: NURSE PRACTITIONER

## 2023-08-07 PROCEDURE — 73110 XR WRIST COMPLETE 3 VIEWS LEFT: ICD-10-PCS | Mod: 26,LT,, | Performed by: RADIOLOGY

## 2023-08-07 PROCEDURE — 73110 X-RAY EXAM OF WRIST: CPT | Mod: 26,LT,, | Performed by: RADIOLOGY

## 2023-08-07 PROCEDURE — 73110 X-RAY EXAM OF WRIST: CPT | Mod: TC,LT

## 2023-08-07 PROCEDURE — 72110 X-RAY EXAM L-2 SPINE 4/>VWS: CPT | Mod: TC

## 2023-08-07 RX ORDER — GABAPENTIN 100 MG/1
100 CAPSULE ORAL 3 TIMES DAILY
Qty: 90 CAPSULE | Refills: 0 | Status: SHIPPED | OUTPATIENT
Start: 2023-08-07

## 2023-08-07 NOTE — PROGRESS NOTES
CC:  Hip pain  68 y.o. Female returns to clinic for a follow up visit regarding     ICD-10-CM ICD-9-CM   1. Pain of left hip  M25.552 719.45       Patient reports her hip pain is better since her TIN. But feels that her leg is week. She is also having numbness down her leg to her ankle. Reports her leg gave away causing her to fall.  She is currently on Neurontin and Mobic.  Neither seem to be helping. She had left hip intra articular injection 1 week ago in radiology  She states that her pain is now below her knee and describes it as a pain like restless leg syndrome.   She recently had a fall and hurt her left wrist and would like to have it evaluated today.  She does have some mild swelling and bruising to the wrist.  Wrist motion is painful.      PAST MEDICAL HISTORY:   Past Medical History:   Diagnosis Date    Abnormal vaginal bleeding with endometrial thickness greater than 5 mm present on transvaginal ultrasound in postmenopausal patient 10/01/2020    Atrophic vaginitis 10/11/2016    Depression     Dysuria     Essential hypertension     Hyperlipemia     UTI (urinary tract infection)     Yeast vaginitis 10/11/2016       PAST SURGICAL HISTORY:   Past Surgical History:   Procedure Laterality Date    BACK SURGERY  1988    Carbon dioxide vaginal rejuvenation laser within the vagina N/A 05/11/2021    Procedure of 1/3 steps performed today at [facility] for severe atrophic vaginitis/dyspareunia    COLONOSCOPY      DIAGNOSTIC LAPAROSCOPY N/A 04/20/2021    Procedure: LAPAROSCOPY, DIAGNOSTIC;  Surgeon: Dedrick Dunbar MD;  Location: [facility] OR;  Service: OB/GYN;  Laterality: N/A;    EXCISION OF BREAST LESION      EYE SURGERY      5 yrs ago    HYSTEROSCOPY WITH DILATION AND CURETTAGE OF UTERUS N/A 04/20/2021    Procedure: HYSTEROSCOPY, WITH DILATION AND CURETTAGE OF UTERUS;  Surgeon: Dedrick Dunbar MD;  Location: [facility] OR;  Service: OB/GYN;  Laterality: N/A;    Laser rejuvenation carbon dioxide N/A  Completion date 07/13/2021    Laser carbon dioxide rejuvenation performed for vaginal contracture atrophic vaginitis-90% improved    SPINE SURGERY  1988         PHYSICAL EXAMINATION:  General    Constitutional: She is oriented to person, place, and time. She appears well-nourished.   HENT:   Head: Normocephalic and atraumatic.   Eyes: Pupils are equal, round, and reactive to light.   Neck: Neck supple.   Cardiovascular:  Normal rate and regular rhythm.            Pulmonary/Chest: Effort normal. No respiratory distress.   Abdominal: There is no abdominal tenderness. There is no guarding.   Neurological: She is alert and oriented to person, place, and time. She has normal reflexes.   Psychiatric: She has a normal mood and affect. Her behavior is normal. Judgment and thought content normal.     General Musculoskeletal Exam   Gait: abnormal     Left Hip Exam     Inspection   Scars: absent  Swelling: absent  No deformity of hip.  Bruising: absent    Tenderness   The patient tender to palpation of the SI joint.    Range of Motion   Extension:  normal   Flexion:  normal   External rotation:  abnormal   Internal rotation: abnormal     Tests   Pain w/ forced internal rotation (APRIL): absent  Pain w/ forced external rotation (FADIR): absent      Back (L-Spine & T-Spine) / Neck (C-Spine) Exam     Tenderness Left paramedian tenderness of the Lower L-Spine.     Back (L-Spine & T-Spine) Range of Motion   Rotation left:  abnormal   Left Hand/Wrist Exam     Inspection   Scars: Wrist - absent Hand -  absent  Effusion: Wrist - present Hand -  absent  Bruising: Wrist - present Hand -  absent  Deformity: Wrist - absent Hand -  absent    Tenderness   The patient is tender to palpation of the ulnar area and radial area.     Other     Sensory Exam  Ulnar Distribution: normal          Muscle Strength   Left Upper Extremity  Wrist extension: 4/5   Wrist flexion: 4/5   :  4/5     Vascular Exam       Left Pulses  Dorsalis Pedis:       2+          Capillary Refill  Left Hand: normal capillary refill        IMAGING:  FL Aspiration and/or Injection Major Joint with Fluoro, Left (XPD)    Result Date: 7/31/2023  EXAMINATION: Injection of medication into left hip joint under fluoroscopic guidance CLINICAL HISTORY: Femoroacetabular impingement of left hip TECHNIQUE: After discussing the risks and benefits of the procedure, written and verbal informed consent was obtained. The risks discussed included allergic reaction, bleeding, infection, and the possibility of damage to nearby structures. The patient was positioned supine on the fluoroscopy table. Suitable entry site at the appropriate joint was selected and marked using fluoroscopic guidance. The anterior left groin/hip was prepped and draped in the usual sterile fashion. Local anesthesia was achieved using 1% lidocaine  solution. Under fluoroscopic guidance a 22 G 3.5  inch needle was inserted into the joint space. Intra-articular placement of the needle was confirmed using 4 mL Isovue-300 contrast. This was followed by the injection of 4 mL Marcaine and 40 mg Kenalog into the joint space.  The needle was removed. The patient tolerated the procedure well and left the procedure in stable condition. Total fluoroscopy time: 1 minute 24 seconds. The procedure was performed by JUAN Ricks under the supervision of this radiologist COMPARISON: No previous similar FINDINGS: Iodinated contrast material was confirmed in the left hip joint.     Successful left hip injection as discussed above . Point of Service: [facility] Electronically signed by: Dax Mark Date:    07/31/2023 Time:    10:30    MRI Hip Without Contrast Left    Result Date: 7/21/2023  EXAMINATION: MRI HIP WITHOUT CONTRAST LEFT CLINICAL HISTORY: Hip pain, chronic, labral tear suspected, xray done; Pain in left hip TECHNIQUE: Sagittal, axial and coronal imaging of the left hip was performed using T1, T2, STIR and gradient  sequence. COMPARISON: None available FINDINGS: Joint alignment is normal. No joint effusion is seen.  Mild hip degenerative changes present. No abnormal osseous marrow signal is present. Muscles, tendons and ligaments are intact without signal abnormality. No abnormal fluid collections or other signal abnormality is seen in the soft tissues. No other abnormalities are demonstrated.     No evidence of abnormality demonstrated Electronically signed by: Kirit Lopez Date:    07/21/2023 Time:    15:04    EXAMINATION:  XR WRIST COMPLETE 3 VIEWS LEFT     CLINICAL HISTORY:  Pain in left wrist     COMPARISON:  None available     TECHNIQUE:  XR WRIST 3 VIEWS LEFT     FINDINGS:  No evidence of fracture seen.  The alignment of the joints appears normal.  Mild diffuse degenerative change is present.  No soft tissue abnormality is seen.     Impression:     No evidence of abnormality demonstrated        Electronically signed by: Kirit Lopez  Date:                                            08/07/2023  Time:                                           14:15           Exam Ended: 08/07/23 13:53 Last Resulted: 08/07/23 14:15           EXAMINATION:  XR LUMBAR SPINE 4-5 VIEW WITH BENDING VIEWS     CLINICAL HISTORY:  Radiculopathy, lumbar region     COMPARISON:  None.     TECHNIQUE:  XR LUMBAR SPINE 4-5 VIEW WITH BENDING VIEWS     FINDINGS:  No fracture is seen. Vertebral body heights and alignment are normal.  No abnormal subluxation with flexion or extension.  There is loss of disc space and degenerative change mild-to-moderate at L5-S1 and mild at L4-5.  Moderate facet joint degenerative changes present.  Pancreatic calcification present on the prior CT     Impression:     Multilevel degenerative changes as described above.        Electronically signed by: Kirit Lopez  Date:                                            08/07/2023  Time:                                           16:05    ASSESSMENT:      ICD-10-CM ICD-9-CM    1. Pain of left hip  M25.552 719.45       PLAN:     -Findings and treatment options were discussed with the patient  -All questions answered  Natural history and expected course discussed. Questions answered.  Educational materials distributed.  NSAIDs per medication orders.  X-rays per orders.  Neurontin refilled  Lumbar spine xray today  Lumbar spine MRI  RTC with Dr Patel after to review results  Removable wrist brace left wrist, NWB    There are no Patient Instructions on file for this visit.    No orders of the defined types were placed in this encounter.      Procedures

## 2023-08-14 RX ORDER — NAPROXEN 500 MG/1
500 TABLET ORAL 2 TIMES DAILY WITH MEALS
Qty: 60 TABLET | Refills: 0 | Status: SHIPPED | OUTPATIENT
Start: 2023-08-14

## 2023-08-17 ENCOUNTER — HOSPITAL ENCOUNTER (OUTPATIENT)
Dept: RADIOLOGY | Facility: HOSPITAL | Age: 68
Discharge: HOME | End: 2023-08-17
Attending: ORTHOPAEDIC SURGERY
Payer: MEDICARE

## 2023-08-17 ENCOUNTER — OFFICE VISIT (OUTPATIENT)
Dept: ORTHOPEDICS | Facility: CLINIC | Age: 68
End: 2023-08-17
Payer: MEDICARE

## 2023-08-17 DIAGNOSIS — S52.502A CLOSED FRACTURE OF DISTAL END OF LEFT RADIUS, UNSPECIFIED FRACTURE MORPHOLOGY, INITIAL ENCOUNTER: ICD-10-CM

## 2023-08-17 DIAGNOSIS — M25.562 LEFT KNEE PAIN, UNSPECIFIED CHRONICITY: ICD-10-CM

## 2023-08-17 DIAGNOSIS — M25.532 LEFT WRIST PAIN: ICD-10-CM

## 2023-08-17 DIAGNOSIS — M54.16 LUMBAR RADICULOPATHY: ICD-10-CM

## 2023-08-17 DIAGNOSIS — M25.552 PAIN OF LEFT HIP: Primary | ICD-10-CM

## 2023-08-17 PROCEDURE — 25600 CLTX DST RDL FX/EPHYS SEP WO: CPT | Mod: S$PBB,LT,, | Performed by: ORTHOPAEDIC SURGERY

## 2023-08-17 PROCEDURE — 73110 X-RAY EXAM OF WRIST: CPT | Mod: TC,LT

## 2023-08-17 PROCEDURE — 99214 OFFICE O/P EST MOD 30 MIN: CPT | Mod: S$PBB,57,, | Performed by: ORTHOPAEDIC SURGERY

## 2023-08-17 PROCEDURE — 25600 PR CLOSED RX DIST RAD/ULNA FX: ICD-10-PCS | Mod: S$PBB,LT,, | Performed by: ORTHOPAEDIC SURGERY

## 2023-08-17 PROCEDURE — 99214 PR OFFICE/OUTPT VISIT, EST, LEVL IV, 30-39 MIN: ICD-10-PCS | Mod: S$PBB,57,, | Performed by: ORTHOPAEDIC SURGERY

## 2023-08-17 PROCEDURE — 99213 OFFICE O/P EST LOW 20 MIN: CPT | Mod: PBBFAC,25 | Performed by: ORTHOPAEDIC SURGERY

## 2023-08-17 PROCEDURE — 73110 X-RAY EXAM OF WRIST: CPT | Mod: 26,LT,, | Performed by: ORTHOPAEDIC SURGERY

## 2023-08-17 PROCEDURE — 73562 X-RAY EXAM OF KNEE 3: CPT | Mod: TC,LT

## 2023-08-17 PROCEDURE — 73562 X-RAY EXAM OF KNEE 3: CPT | Mod: 26,LT,, | Performed by: ORTHOPAEDIC SURGERY

## 2023-08-17 PROCEDURE — 73562 XR KNEE 3 VIEW LEFT: ICD-10-PCS | Mod: 26,LT,, | Performed by: ORTHOPAEDIC SURGERY

## 2023-08-17 PROCEDURE — 73110 XR WRIST COMPLETE 3 VIEWS LEFT: ICD-10-PCS | Mod: 26,LT,, | Performed by: ORTHOPAEDIC SURGERY

## 2023-08-17 PROCEDURE — 25600 CLTX DST RDL FX/EPHYS SEP WO: CPT | Mod: PBBFAC,LT | Performed by: ORTHOPAEDIC SURGERY

## 2023-08-17 NOTE — PROGRESS NOTES
68 y.o. Female returns to clinic for a follow up visit regarding     ICD-10-CM ICD-9-CM   1. Pain of left hip  M25.552 719.45   2. Closed fracture of distal end of left radius, unspecified fracture morphology, initial encounter  S52.502A 813.42   3. Lumbar radiculopathy  M54.16 724.4   4. Left knee pain, unspecified chronicity  M25.562 719.46        She states that she is better with her radiculopathy since her MRI. She is having some left knee pain since her back injury. She says she is walking differently and her knee has flared up. She is also wearing a removable splint on her left wrist. She fell a few days ago, landing on her left wrist       Past Medical History:   Diagnosis Date    Abnormal vaginal bleeding with endometrial thickness greater than 5 mm present on transvaginal ultrasound in postmenopausal patient 10/01/2020    Atrophic vaginitis 10/11/2016    Depression     Dysuria     Essential hypertension     Hyperlipemia     UTI (urinary tract infection)     Yeast vaginitis 10/11/2016     Past Surgical History:   Procedure Laterality Date    BACK SURGERY  1988    Carbon dioxide vaginal rejuvenation laser within the vagina N/A 05/11/2021    Procedure of 1/3 steps performed today at [facility] for severe atrophic vaginitis/dyspareunia    COLONOSCOPY      DIAGNOSTIC LAPAROSCOPY N/A 04/20/2021    Procedure: LAPAROSCOPY, DIAGNOSTIC;  Surgeon: Dedrick Dunbar MD;  Location: [facility] OR;  Service: OB/GYN;  Laterality: N/A;    EXCISION OF BREAST LESION      EYE SURGERY      5 yrs ago    HYSTEROSCOPY WITH DILATION AND CURETTAGE OF UTERUS N/A 04/20/2021    Procedure: HYSTEROSCOPY, WITH DILATION AND CURETTAGE OF UTERUS;  Surgeon: Dedrick Dunbar MD;  Location: [facility] OR;  Service: OB/GYN;  Laterality: N/A;    Laser rejuvenation carbon dioxide N/A Completion date 07/13/2021    Laser carbon dioxide rejuvenation performed for vaginal contracture atrophic vaginitis-90% improved    SPINE SURGERY  1988          PHYSICAL EXAMINATION:    Ortho/SPM Exam  Left wrist was inspected today there is mild tenderness along the radial styloid and mild tenderness over the ulna.  Left hip was likewise inspected she has a positive C sign but only mild pain with Fader and APRIL maneuver.  Left knee was also inspected demonstrating positive mediolateral joint line tenderness and a positive Hue's test with no effusion satisfactory range of motion from 0/0/130°    IMAGING:  MRI Lumbar Spine Without Contrast    Result Date: 8/9/2023  EXAMINATION: MRI LUMBAR SPINE WITHOUT CONTRAST CLINICAL HISTORY: lumbar rediculopathy; Radiculopathy, lumbar region TECHNIQUE: Multiplanar, multisequence MRI of the lumbar spine performed without the administration of contrast. COMPARISON: Radiograph 08/07/2023 FINDINGS: The vertebral body heights and alignment are maintained.  The marrow signal is normal.  There is disc degeneration throughout.  The conus terminates at L1. L1-2: No spinal canal or foraminal stenosis. L2-3: No spinal canal or foraminal stenosis. At L3-4 there is a disc bulge and facet degeneration with mild spinal canal and mild bilateral foraminal stenosis. There appears to be an extruded disc that appears to be rising from the left lateral/near foraminal L4-5 disc and extends superiorly along the posterior L4 vertebral body just to the left of midline.  This effaces the left lateral recess at the L4-5 disc level and extends superiorly.  There is also some involvement of the left proximal 4 5 foramen which is effaced and has moderate to high-grade narrowing.  There is mild-to-moderate spinal canal narrowing at this level from the disc degeneration/herniation and facet degeneration.  Mild to moderate right foraminal narrowing. L5-S1: Facet degeneration.  No spinal canal stenosis.  Mild bilateral foraminal stenosis from osteophytes.     Degenerative changes most notably at the L4-5 level where there is a disc extrusion that displaces  the traversing L5 nerve root as well as involves the left L4-5 neural foramen contributing to high-grade foraminal narrowing of the left L4 nerve. Electronically signed by: Dieudonne Beauchamp Date:    08/09/2023 Time:    14:42    X-Ray Lumbar 4-5 View including Bending Views    Result Date: 8/7/2023  EXAMINATION: XR LUMBAR SPINE 4-5 VIEW WITH BENDING VIEWS CLINICAL HISTORY: Radiculopathy, lumbar region COMPARISON: None. TECHNIQUE: XR LUMBAR SPINE 4-5 VIEW WITH BENDING VIEWS FINDINGS: No fracture is seen. Vertebral body heights and alignment are normal.  No abnormal subluxation with flexion or extension.  There is loss of disc space and degenerative change mild-to-moderate at L5-S1 and mild at L4-5.  Moderate facet joint degenerative changes present.  Pancreatic calcification present on the prior CT     Multilevel degenerative changes as described above. Electronically signed by: Kirit Lopez Date:    08/07/2023 Time:    16:05    X-Ray Wrist Complete 3 views Left    Result Date: 8/7/2023  EXAMINATION: XR WRIST COMPLETE 3 VIEWS LEFT CLINICAL HISTORY: Pain in left wrist COMPARISON: None available TECHNIQUE: XR WRIST 3 VIEWS LEFT FINDINGS: No evidence of fracture seen.  The alignment of the joints appears normal.  Mild diffuse degenerative change is present.  No soft tissue abnormality is seen.     No evidence of abnormality demonstrated Electronically signed by: Kirit Lopez Date:    08/07/2023 Time:    14:15    FL Aspiration and/or Injection Major Joint with Fluoro, Left (XPD)    Result Date: 7/31/2023  EXAMINATION: Injection of medication into left hip joint under fluoroscopic guidance CLINICAL HISTORY: Femoroacetabular impingement of left hip TECHNIQUE: After discussing the risks and benefits of the procedure, written and verbal informed consent was obtained. The risks discussed included allergic reaction, bleeding, infection, and the possibility of damage to nearby structures. The patient was positioned supine on the  fluoroscopy table. Suitable entry site at the appropriate joint was selected and marked using fluoroscopic guidance. The anterior left groin/hip was prepped and draped in the usual sterile fashion. Local anesthesia was achieved using 1% lidocaine  solution. Under fluoroscopic guidance a 22 G 3.5  inch needle was inserted into the joint space. Intra-articular placement of the needle was confirmed using 4 mL Isovue-300 contrast. This was followed by the injection of 4 mL Marcaine and 40 mg Kenalog into the joint space.  The needle was removed. The patient tolerated the procedure well and left the procedure in stable condition. Total fluoroscopy time: 1 minute 24 seconds. The procedure was performed by JUAN Ricks under the supervision of this radiologist COMPARISON: No previous similar FINDINGS: Iodinated contrast material was confirmed in the left hip joint.     Successful left hip injection as discussed above . [facility] Electronically signed by: Dax Mark Date:    07/31/2023 Time:    10:30    MRI Hip Without Contrast Left    Result Date: 7/21/2023  EXAMINATION: MRI HIP WITHOUT CONTRAST LEFT CLINICAL HISTORY: Hip pain, chronic, labral tear suspected, xray done; Pain in left hip TECHNIQUE: Sagittal, axial and coronal imaging of the left hip was performed using T1, T2, STIR and gradient sequence. COMPARISON: None available FINDINGS: Joint alignment is normal. No joint effusion is seen.  Mild hip degenerative changes present. No abnormal osseous marrow signal is present. Muscles, tendons and ligaments are intact without signal abnormality. No abnormal fluid collections or other signal abnormality is seen in the soft tissues. No other abnormalities are demonstrated.     No evidence of abnormality demonstrated Electronically signed by: Kirit Lopez Date:    07/21/2023 Time:    15:04    X-Ray Hip 2 or 3 views Left (with Pelvis when performed)    Result Date: 7/20/2023  See  Procedure Notes for results. IMPRESSION: Please see Ortho procedure notes for report.  This procedure was auto-finalized by: Virtual Radiologist       ASSESSMENT:      ICD-10-CM ICD-9-CM   1. Pain of left hip  M25.552 719.45   2. Closed fracture of distal end of left radius, unspecified fracture morphology, initial encounter  S52.502A 813.42   3. Lumbar radiculopathy  M54.16 724.4   4. Left knee pain, unspecified chronicity  M25.562 719.46       PLAN:     -Findings and treatment options were discussed with the patient  -All questions answered      Naproxen is helping tremendously    She does have a small avulsion injury of the wrist which I was able to detect on her radiographs continue the use of removable wrist brace.  No surgical intervention necessary.  Pain in her low back is also improving.  MRI does show some degenerative changes with small disc herniations.  Injection for her left hip femoroacetabular impingement has improved.  Continue conservative care.  See back in 6 weeks.    There are no Patient Instructions on file for this visit.      Orders Placed This Encounter   Procedures    X-Ray Wrist Complete 3 views Left    X-Ray Knee 3 View Left         Procedures

## 2023-09-08 DIAGNOSIS — M26.609 TMJ (TEMPOROMANDIBULAR JOINT DISORDER): ICD-10-CM

## 2023-09-08 DIAGNOSIS — M25.511 ACUTE PAIN OF RIGHT SHOULDER: ICD-10-CM

## 2023-09-08 RX ORDER — MELOXICAM 15 MG/1
TABLET ORAL
Qty: 90 TABLET | Refills: 3 | Status: SHIPPED | OUTPATIENT
Start: 2023-09-08

## 2023-09-12 DIAGNOSIS — Z12.11 SCREENING FOR MALIGNANT NEOPLASM OF COLON: Primary | ICD-10-CM

## 2023-09-15 ENCOUNTER — TELEPHONE (OUTPATIENT)
Dept: FAMILY MEDICINE | Facility: CLINIC | Age: 68
End: 2023-09-15
Payer: MEDICARE

## 2023-09-15 NOTE — TELEPHONE ENCOUNTER
Pt called states she needs a referral for Dr. Bob Aaron for a colonoscope.  She was not able to schedule it herself.

## 2023-09-20 ENCOUNTER — PATIENT MESSAGE (OUTPATIENT)
Dept: ADMINISTRATIVE | Facility: HOSPITAL | Age: 68
End: 2023-09-20

## 2023-09-21 DIAGNOSIS — Z12.11 SCREENING FOR COLON CANCER: Primary | ICD-10-CM

## 2023-09-21 RX ORDER — POLYETHYLENE GLYCOL 3350, SODIUM SULFATE ANHYDROUS, SODIUM BICARBONATE, SODIUM CHLORIDE, POTASSIUM CHLORIDE 236; 22.74; 6.74; 5.86; 2.97 G/4L; G/4L; G/4L; G/4L; G/4L
4 POWDER, FOR SOLUTION ORAL ONCE
Qty: 4000 ML | Refills: 0 | Status: SHIPPED | OUTPATIENT
Start: 2023-09-21 | End: 2023-09-21

## 2023-10-18 DIAGNOSIS — S52.502A CLOSED FRACTURE OF DISTAL END OF LEFT RADIUS, UNSPECIFIED FRACTURE MORPHOLOGY, INITIAL ENCOUNTER: Primary | ICD-10-CM

## 2023-10-19 ENCOUNTER — HOSPITAL ENCOUNTER (OUTPATIENT)
Dept: RADIOLOGY | Facility: HOSPITAL | Age: 68
Discharge: HOME | End: 2023-10-19
Attending: ORTHOPAEDIC SURGERY
Payer: MEDICARE

## 2023-10-19 ENCOUNTER — OFFICE VISIT (OUTPATIENT)
Dept: ORTHOPEDICS | Facility: CLINIC | Age: 68
End: 2023-10-19
Payer: MEDICARE

## 2023-10-19 DIAGNOSIS — M65.4 RADIAL STYLOID TENOSYNOVITIS (DE QUERVAIN): Primary | ICD-10-CM

## 2023-10-19 DIAGNOSIS — M54.16 LUMBAR RADICULOPATHY: ICD-10-CM

## 2023-10-19 DIAGNOSIS — S52.502A CLOSED FRACTURE OF DISTAL END OF LEFT RADIUS, UNSPECIFIED FRACTURE MORPHOLOGY, INITIAL ENCOUNTER: ICD-10-CM

## 2023-10-19 PROCEDURE — 73110 X-RAY EXAM OF WRIST: CPT | Mod: 26,LT,, | Performed by: ORTHOPAEDIC SURGERY

## 2023-10-19 PROCEDURE — 20550 NJX 1 TENDON SHEATH/LIGAMENT: CPT | Mod: PBBFAC,LT | Performed by: ORTHOPAEDIC SURGERY

## 2023-10-19 PROCEDURE — 20550 TENDON SHEATH: ICD-10-PCS | Mod: S$PBB,79,LT, | Performed by: ORTHOPAEDIC SURGERY

## 2023-10-19 PROCEDURE — 99999PBSHW PR PBB SHADOW TECHNICAL ONLY FILED TO HB: Mod: PBBFAC,,,

## 2023-10-19 PROCEDURE — 99212 OFFICE O/P EST SF 10 MIN: CPT | Mod: PBBFAC,25 | Performed by: ORTHOPAEDIC SURGERY

## 2023-10-19 PROCEDURE — 99999PBSHW PR PBB SHADOW TECHNICAL ONLY FILED TO HB: ICD-10-PCS | Mod: PBBFAC,,,

## 2023-10-19 PROCEDURE — 73110 XR WRIST COMPLETE 3 VIEWS LEFT: ICD-10-PCS | Mod: 26,LT,, | Performed by: ORTHOPAEDIC SURGERY

## 2023-10-19 PROCEDURE — 99213 PR OFFICE/OUTPT VISIT, EST, LEVL III, 20-29 MIN: ICD-10-PCS | Mod: S$PBB,24,25, | Performed by: ORTHOPAEDIC SURGERY

## 2023-10-19 PROCEDURE — 73110 X-RAY EXAM OF WRIST: CPT | Mod: TC,LT

## 2023-10-19 PROCEDURE — 99213 OFFICE O/P EST LOW 20 MIN: CPT | Mod: S$PBB,24,25, | Performed by: ORTHOPAEDIC SURGERY

## 2023-10-19 RX ORDER — TRIAMCINOLONE ACETONIDE 40 MG/ML
40 INJECTION, SUSPENSION INTRA-ARTICULAR; INTRAMUSCULAR
Status: DISCONTINUED | OUTPATIENT
Start: 2023-10-19 | End: 2023-10-19 | Stop reason: HOSPADM

## 2023-10-19 RX ADMIN — TRIAMCINOLONE ACETONIDE 40 MG: 400 INJECTION, SUSPENSION INTRA-ARTICULAR; INTRAMUSCULAR at 01:10

## 2023-10-19 NOTE — PROGRESS NOTES
68 y.o. Female returns to clinic for a follow up visit regarding     ICD-10-CM ICD-9-CM   1. Radial styloid tenosynovitis (de quervain)  M65.4 727.04        States her wrist and knee is better. She would like to have therapy for her back.       Past Medical History:   Diagnosis Date    Abnormal vaginal bleeding with endometrial thickness greater than 5 mm present on transvaginal ultrasound in postmenopausal patient 10/01/2020    Atrophic vaginitis 10/11/2016    Depression     Dysuria     Essential hypertension     Hyperlipemia     UTI (urinary tract infection)     Yeast vaginitis 10/11/2016     Past Surgical History:   Procedure Laterality Date    BACK SURGERY  1988    Carbon dioxide vaginal rejuvenation laser within the vagina N/A 05/11/2021    Procedure of 1/3 steps performed today at [facility] for severe atrophic vaginitis/dyspareunia    COLONOSCOPY      DIAGNOSTIC LAPAROSCOPY N/A 04/20/2021    Procedure: LAPAROSCOPY, DIAGNOSTIC;  Surgeon: Dedrick Dunbar MD;  Location: [facility] OR;  Service: OB/GYN;  Laterality: N/A;    EXCISION OF BREAST LESION      EYE SURGERY      5 yrs ago    HYSTEROSCOPY WITH DILATION AND CURETTAGE OF UTERUS N/A 04/20/2021    Procedure: HYSTEROSCOPY, WITH DILATION AND CURETTAGE OF UTERUS;  Surgeon: Dedrick Dunbar MD;  Location: [facility] OR;  Service: OB/GYN;  Laterality: N/A;    Laser rejuvenation carbon dioxide N/A Completion date 07/13/2021    Laser carbon dioxide rejuvenation performed for vaginal contracture atrophic vaginitis-90% improved    SPINE SURGERY  1988         PHYSICAL EXAMINATION:            Left Hand/Wrist Exam     Tenderness   The patient is tender to palpation of the dorsal area and radial area.     Range of Motion     Wrist   Extension:  normal   Flexion:  normal   Abduction: normal  Adduction: normal    Tests   Tinel's sign (median nerve): negative  Finkelstein's test: positive        Left Elbow Exam     Inspection   Scars: absent  Effusion:  absent  Deformity: absent    Range of Motion   Extension:  normal   Flexion:  normal   Pronation:  normal   Supination:  normal         Vascular Exam       Left Pulses      Radial:                    2+      Capillary Refill  Left Hand: normal capillary refill        Edema  Left Forearm: absent        IMAGING:  X-Ray Wrist Complete Left    Result Date: 10/19/2023  See Procedure Notes for results. IMPRESSION: Please see Ortho procedure notes for report.  This procedure was auto-finalized by: Virtual Radiologist   Views left wrist demonstrate a healing distal radius fracture in excellent alignment    ASSESSMENT:      ICD-10-CM ICD-9-CM   1. Radial styloid tenosynovitis (de quervain)  M65.4 727.04       PLAN:     -Findings and treatment options were discussed with the patient  -All questions answered      Also complaining of lumbar back pain.  Will refer to spine surgery as well as physical therapy.  Injection given today please see the attached procedure note.    There are no Patient Instructions on file for this visit.      Orders Placed This Encounter   Procedures    Tendon Sheath         Tendon Sheath    Date/Time: 10/19/2023 1:15 PM    Performed by: Dionicio Patel MD  Authorized by: Dionicio Patel MD    Location:  Wrist  Site:  L first doral compartment  Needle size:  25 G  Medications:  40 mg triamcinolone acetonide 40 mg/mL

## 2023-10-20 ENCOUNTER — OFFICE VISIT (OUTPATIENT)
Dept: FAMILY MEDICINE | Facility: CLINIC | Age: 68
End: 2023-10-20
Payer: MEDICARE

## 2023-10-20 VITALS
TEMPERATURE: 98 F | HEIGHT: 61 IN | RESPIRATION RATE: 18 BRPM | SYSTOLIC BLOOD PRESSURE: 144 MMHG | BODY MASS INDEX: 23.41 KG/M2 | DIASTOLIC BLOOD PRESSURE: 89 MMHG | OXYGEN SATURATION: 98 % | HEART RATE: 67 BPM | WEIGHT: 124 LBS

## 2023-10-20 DIAGNOSIS — Z11.59 NEED FOR HEPATITIS C SCREENING TEST: ICD-10-CM

## 2023-10-20 DIAGNOSIS — Z78.0 MENOPAUSE: Chronic | ICD-10-CM

## 2023-10-20 DIAGNOSIS — F41.9 ANXIETY: Chronic | ICD-10-CM

## 2023-10-20 DIAGNOSIS — I10 PRIMARY HYPERTENSION: Primary | Chronic | ICD-10-CM

## 2023-10-20 DIAGNOSIS — Z13.820 SCREENING FOR OSTEOPOROSIS: ICD-10-CM

## 2023-10-20 DIAGNOSIS — Z23 NEED FOR VACCINATION: ICD-10-CM

## 2023-10-20 DIAGNOSIS — Z12.31 ENCOUNTER FOR SCREENING MAMMOGRAM FOR MALIGNANT NEOPLASM OF BREAST: ICD-10-CM

## 2023-10-20 DIAGNOSIS — I10 ESSENTIAL (PRIMARY) HYPERTENSION: ICD-10-CM

## 2023-10-20 DIAGNOSIS — N32.81 OAB (OVERACTIVE BLADDER): Chronic | ICD-10-CM

## 2023-10-20 DIAGNOSIS — E55.9 VITAMIN D DEFICIENCY: Chronic | ICD-10-CM

## 2023-10-20 DIAGNOSIS — E78.00 PURE HYPERCHOLESTEROLEMIA: Chronic | ICD-10-CM

## 2023-10-20 LAB
25(OH)D3 SERPL-MCNC: 20.6 NG/ML
ALBUMIN SERPL BCP-MCNC: 4 G/DL (ref 3.5–5)
ALBUMIN/GLOB SERPL: 1.3 {RATIO}
ALP SERPL-CCNC: 74 U/L (ref 55–142)
ALT SERPL W P-5'-P-CCNC: 28 U/L (ref 13–56)
ANION GAP SERPL CALCULATED.3IONS-SCNC: 9 MMOL/L (ref 7–16)
AST SERPL W P-5'-P-CCNC: 19 U/L (ref 15–37)
BACTERIA #/AREA URNS HPF: ABNORMAL /HPF
BASOPHILS # BLD AUTO: 0.05 K/UL (ref 0–0.2)
BASOPHILS NFR BLD AUTO: 0.9 % (ref 0–1)
BILIRUB SERPL-MCNC: 1.1 MG/DL (ref ?–1.2)
BILIRUB UR QL STRIP: NEGATIVE
BUN SERPL-MCNC: 15 MG/DL (ref 7–18)
BUN/CREAT SERPL: 16 (ref 6–20)
CALCIUM SERPL-MCNC: 9.4 MG/DL (ref 8.5–10.1)
CHLORIDE SERPL-SCNC: 110 MMOL/L (ref 98–107)
CHOLEST SERPL-MCNC: 157 MG/DL (ref 0–200)
CHOLEST/HDLC SERPL: 2.4 {RATIO}
CLARITY UR: CLEAR
CO2 SERPL-SCNC: 26 MMOL/L (ref 21–32)
COLOR UR: ABNORMAL
CREAT SERPL-MCNC: 0.95 MG/DL (ref 0.55–1.02)
DIFFERENTIAL METHOD BLD: ABNORMAL
EGFR (NO RACE VARIABLE) (RUSH/TITUS): 66 ML/MIN/1.73M2
EOSINOPHIL # BLD AUTO: 0.22 K/UL (ref 0–0.5)
EOSINOPHIL NFR BLD AUTO: 3.8 % (ref 1–4)
ERYTHROCYTE [DISTWIDTH] IN BLOOD BY AUTOMATED COUNT: 12.3 % (ref 11.5–14.5)
GLOBULIN SER-MCNC: 3 G/DL (ref 2–4)
GLUCOSE SERPL-MCNC: 94 MG/DL (ref 74–106)
GLUCOSE UR STRIP-MCNC: NORMAL MG/DL
HCT VFR BLD AUTO: 39.7 % (ref 38–47)
HCV AB SER QL: NORMAL
HDLC SERPL-MCNC: 65 MG/DL (ref 40–60)
HGB BLD-MCNC: 13.3 G/DL (ref 12–16)
IMM GRANULOCYTES # BLD AUTO: 0.01 K/UL (ref 0–0.04)
IMM GRANULOCYTES NFR BLD: 0.2 % (ref 0–0.4)
KETONES UR STRIP-SCNC: NEGATIVE MG/DL
LDLC SERPL CALC-MCNC: 80 MG/DL
LDLC/HDLC SERPL: 1.2 {RATIO}
LEUKOCYTE ESTERASE UR QL STRIP: ABNORMAL
LYMPHOCYTES # BLD AUTO: 1.51 K/UL (ref 1–4.8)
LYMPHOCYTES NFR BLD AUTO: 26.2 % (ref 27–41)
MCH RBC QN AUTO: 29.8 PG (ref 27–31)
MCHC RBC AUTO-ENTMCNC: 33.5 G/DL (ref 32–36)
MCV RBC AUTO: 88.8 FL (ref 80–96)
MONOCYTES # BLD AUTO: 0.6 K/UL (ref 0–0.8)
MONOCYTES NFR BLD AUTO: 10.4 % (ref 2–6)
MPC BLD CALC-MCNC: 10.7 FL (ref 9.4–12.4)
MUCOUS, UA: ABNORMAL /LPF
NEUTROPHILS # BLD AUTO: 3.37 K/UL (ref 1.8–7.7)
NEUTROPHILS NFR BLD AUTO: 58.5 % (ref 53–65)
NITRITE UR QL STRIP: NEGATIVE
NONHDLC SERPL-MCNC: 92 MG/DL
NRBC # BLD AUTO: 0 X10E3/UL
NRBC, AUTO (.00): 0 %
PH UR STRIP: 5 PH UNITS
PLATELET # BLD AUTO: 236 K/UL (ref 150–400)
POTASSIUM SERPL-SCNC: 4.2 MMOL/L (ref 3.5–5.1)
PROT SERPL-MCNC: 7 G/DL (ref 6.4–8.2)
PROT UR QL STRIP: NEGATIVE
RBC # BLD AUTO: 4.47 M/UL (ref 4.2–5.4)
RBC # UR STRIP: ABNORMAL /UL
RBC #/AREA URNS HPF: 20 /HPF
SODIUM SERPL-SCNC: 141 MMOL/L (ref 136–145)
SP GR UR STRIP: 1.02
SQUAMOUS #/AREA URNS LPF: ABNORMAL /HPF
TRIGL SERPL-MCNC: 61 MG/DL (ref 35–150)
TSH SERPL DL<=0.005 MIU/L-ACNC: 0.4 UIU/ML (ref 0.36–3.74)
UROBILINOGEN UR STRIP-ACNC: NORMAL MG/DL
VLDLC SERPL-MCNC: 12 MG/DL
WBC # BLD AUTO: 5.76 K/UL (ref 4.5–11)
WBC #/AREA URNS HPF: 7 /HPF

## 2023-10-20 PROCEDURE — 80053 COMPREHENSIVE METABOLIC PANEL: ICD-10-PCS | Mod: ,,, | Performed by: CLINICAL MEDICAL LABORATORY

## 2023-10-20 PROCEDURE — G0009 ADMIN PNEUMOCOCCAL VACCINE: HCPCS | Mod: ,,, | Performed by: FAMILY MEDICINE

## 2023-10-20 PROCEDURE — 84443 ASSAY THYROID STIM HORMONE: CPT | Mod: ,,, | Performed by: CLINICAL MEDICAL LABORATORY

## 2023-10-20 PROCEDURE — G0008 ADMIN INFLUENZA VIRUS VAC: HCPCS | Mod: ,,, | Performed by: FAMILY MEDICINE

## 2023-10-20 PROCEDURE — 84443 TSH: ICD-10-PCS | Mod: ,,, | Performed by: CLINICAL MEDICAL LABORATORY

## 2023-10-20 PROCEDURE — 81001 URINALYSIS AUTO W/SCOPE: CPT | Mod: ,,, | Performed by: CLINICAL MEDICAL LABORATORY

## 2023-10-20 PROCEDURE — 90677 PNEUMOCOCCAL CONJUGATE VACCINE 20-VALENT: ICD-10-PCS | Mod: ,,, | Performed by: FAMILY MEDICINE

## 2023-10-20 PROCEDURE — 80061 LIPID PANEL: ICD-10-PCS | Mod: ,,, | Performed by: CLINICAL MEDICAL LABORATORY

## 2023-10-20 PROCEDURE — 90686 FLU VACCINE (QUAD) GREATER THAN OR EQUAL TO 3YO PRESERVATIVE FREE IM: ICD-10-PCS | Mod: ,,, | Performed by: FAMILY MEDICINE

## 2023-10-20 PROCEDURE — 99214 PR OFFICE/OUTPT VISIT, EST, LEVL IV, 30-39 MIN: ICD-10-PCS | Mod: ,,, | Performed by: FAMILY MEDICINE

## 2023-10-20 PROCEDURE — 80053 COMPREHEN METABOLIC PANEL: CPT | Mod: ,,, | Performed by: CLINICAL MEDICAL LABORATORY

## 2023-10-20 PROCEDURE — 80061 LIPID PANEL: CPT | Mod: ,,, | Performed by: CLINICAL MEDICAL LABORATORY

## 2023-10-20 PROCEDURE — 86803 HEPATITIS C AB TEST: CPT | Mod: ,,, | Performed by: CLINICAL MEDICAL LABORATORY

## 2023-10-20 PROCEDURE — G0009 PNEUMOCOCCAL CONJUGATE VACCINE 20-VALENT: ICD-10-PCS | Mod: ,,, | Performed by: FAMILY MEDICINE

## 2023-10-20 PROCEDURE — 90686 IIV4 VACC NO PRSV 0.5 ML IM: CPT | Mod: ,,, | Performed by: FAMILY MEDICINE

## 2023-10-20 PROCEDURE — 99214 OFFICE O/P EST MOD 30 MIN: CPT | Mod: ,,, | Performed by: FAMILY MEDICINE

## 2023-10-20 PROCEDURE — 85025 COMPLETE CBC W/AUTO DIFF WBC: CPT | Mod: ,,, | Performed by: CLINICAL MEDICAL LABORATORY

## 2023-10-20 PROCEDURE — 81001 URINALYSIS, REFLEX TO URINE CULTURE: ICD-10-PCS | Mod: ,,, | Performed by: CLINICAL MEDICAL LABORATORY

## 2023-10-20 PROCEDURE — 90677 PCV20 VACCINE IM: CPT | Mod: ,,, | Performed by: FAMILY MEDICINE

## 2023-10-20 PROCEDURE — G0008 FLU VACCINE (QUAD) GREATER THAN OR EQUAL TO 3YO PRESERVATIVE FREE IM: ICD-10-PCS | Mod: ,,, | Performed by: FAMILY MEDICINE

## 2023-10-20 PROCEDURE — 82306 VITAMIN D: ICD-10-PCS | Mod: ,,, | Performed by: CLINICAL MEDICAL LABORATORY

## 2023-10-20 PROCEDURE — 82306 VITAMIN D 25 HYDROXY: CPT | Mod: ,,, | Performed by: CLINICAL MEDICAL LABORATORY

## 2023-10-20 PROCEDURE — 85025 CBC WITH DIFFERENTIAL: ICD-10-PCS | Mod: ,,, | Performed by: CLINICAL MEDICAL LABORATORY

## 2023-10-20 PROCEDURE — 86803 HEPATITIS C ANTIBODY: ICD-10-PCS | Mod: ,,, | Performed by: CLINICAL MEDICAL LABORATORY

## 2023-10-20 RX ORDER — CYCLOBENZAPRINE HCL 10 MG
10 TABLET ORAL 3 TIMES DAILY PRN
Qty: 270 TABLET | Refills: 3 | Status: SHIPPED | OUTPATIENT
Start: 2023-10-20

## 2023-10-20 RX ORDER — TOLTERODINE 4 MG/1
4 CAPSULE, EXTENDED RELEASE ORAL DAILY
Qty: 90 CAPSULE | Refills: 3 | Status: SHIPPED | OUTPATIENT
Start: 2023-10-20

## 2023-10-20 RX ORDER — CITALOPRAM 20 MG/1
20 TABLET, FILM COATED ORAL DAILY
Qty: 90 TABLET | Refills: 3 | Status: SHIPPED | OUTPATIENT
Start: 2023-10-20 | End: 2024-10-19

## 2023-10-20 RX ORDER — CYCLOBENZAPRINE HCL 10 MG
10 TABLET ORAL
COMMUNITY
Start: 2023-08-22 | End: 2023-10-20 | Stop reason: SDUPTHER

## 2023-11-01 ENCOUNTER — HOSPITAL ENCOUNTER (OUTPATIENT)
Dept: RADIOLOGY | Facility: HOSPITAL | Age: 68
Discharge: HOME | End: 2023-11-01
Attending: FAMILY MEDICINE
Payer: MEDICARE

## 2023-11-01 ENCOUNTER — CLINICAL SUPPORT (OUTPATIENT)
Dept: REHABILITATION | Facility: HOSPITAL | Age: 68
End: 2023-11-01
Payer: MEDICARE

## 2023-11-01 DIAGNOSIS — M53.2X6 LUMBAR SPINE INSTABILITY: Primary | ICD-10-CM

## 2023-11-01 DIAGNOSIS — M54.16 LUMBAR RADICULOPATHY: ICD-10-CM

## 2023-11-01 DIAGNOSIS — Z78.0 MENOPAUSE: ICD-10-CM

## 2023-11-01 DIAGNOSIS — R53.1 WEAKNESS: ICD-10-CM

## 2023-11-01 DIAGNOSIS — Z13.820 SCREENING FOR OSTEOPOROSIS: ICD-10-CM

## 2023-11-01 PROBLEM — M12.811 ROTATOR CUFF TEAR ARTHROPATHY OF RIGHT SHOULDER: Status: RESOLVED | Noted: 2021-05-12 | Resolved: 2023-11-01

## 2023-11-01 PROBLEM — M75.101 ROTATOR CUFF TEAR ARTHROPATHY OF RIGHT SHOULDER: Status: RESOLVED | Noted: 2021-05-12 | Resolved: 2023-11-01

## 2023-11-01 PROCEDURE — 77080 DXA BONE DENSITY AXIAL SKELETON 1 OR MORE SITES: ICD-10-PCS | Mod: 26,,, | Performed by: RADIOLOGY

## 2023-11-01 PROCEDURE — 97162 PT EVAL MOD COMPLEX 30 MIN: CPT

## 2023-11-01 PROCEDURE — 97110 THERAPEUTIC EXERCISES: CPT

## 2023-11-01 PROCEDURE — 77080 DXA BONE DENSITY AXIAL: CPT | Mod: 26,,, | Performed by: RADIOLOGY

## 2023-11-01 PROCEDURE — 77080 DXA BONE DENSITY AXIAL: CPT | Mod: TC

## 2023-11-01 NOTE — PLAN OF CARE
[facility]   Physical Therapy Initial Evaluation      Name: Madeleine Patel [facility] Number: [id number]    Therapy Diagnosis:   Encounter Diagnosis   Name Primary?    Lumbar radiculopathy         Physician: Dionicio Patel MD    Physician Orders: PT Eval and Treat  Medical Diagnosis from Referral: lumbar radiculopathy  Evaluation Date: 11/1/2023  Authorization Period Expiration: 10/18/24  Plan of Care Expiration: 12/1/23  Progress Note Due: 12/1/23  Visit # / Visits authorized: 1/ Medicare cap  FOTO: 42 at IE    Precautions: Standard     Time In: 01:45PM  Time Out: 02:22PM  Total Appointment Time (timed & untimed codes): 37 minutes    Subjective     Date of onset: end of July    History of current condition - Madeleine reports to physical therapy with reports of low back pain and femoral nerve irritation stating that it seemed to begin the day after she helped her  move a piano.  States that she was already taking Gabapentin and tried it for her back which did seem to help.  Went to see Dr. Dionicio Patel about her pain, thinking it was coming from her hip.  Had hip x-ray and MRI performed showing really only impingement.  Tried an intra-articular injection, really not touching the pain she was experiencing.  Went back for a second MRI for lumbar spine which revealed L4/L5 disc extrusion. Prescribed naproxen and muscle relaxer by Dr. Dionicio Patel which seemed to help a bit.  Referred over for therapy and to Dr. Zak Valdez at that time.     Now at this point, overall reports that the back and hip are feeling better.  Also reports that the knee that she injured during a fall is feeling better.  More concerned at this point about not flaring up again and looking for tips for strengthening.  Spends her days sitting for the most part, gets on the elliptical at the house occasionally.  Wants to start walking with her , but has not yet begun.  Spends a lot of her time following her grandson's  baseball team.  Did have a lumbar discectomy performed back in the 1980s.  Nonreciprocal with stairs, just feeling weak at this point trying to go up and down stairs.  Up stairs worse than going down stairs.  Does state the knee heather from time to time still.     Lives with her  and adult son with autism in a two story home.      Falls: Fell about a month ago, injuring her thumb and L knee.     Imaging: MRI studies: lumbar spine MRI showing disc extrusion at L4/L5.     Prior Therapy: Attended therapy here in 2021 for R RCR  Social History: Lives with   Occupation: Retired  Prior Level of Function: No limitations  Current Level of Function: Weakness w/ intermittent low back pain    Pain:  Current 2/10, worst 8/10, best 1/10   Location: L sided lumbar side down into L anterior thigh  Description: Dull, Sharp, and Shooting  Aggravating Factors: Sitting, Standing, and Laying  Easing Factors: heating pad    Patients goals: to be pain free     Medical History:   Past Medical History:   Diagnosis Date    Abnormal vaginal bleeding with endometrial thickness greater than 5 mm present on transvaginal ultrasound in postmenopausal patient 10/01/2020    Atrophic vaginitis 10/11/2016    Depression     Dysuria     Essential hypertension     Hyperlipemia     UTI (urinary tract infection)     Yeast vaginitis 10/11/2016       Surgical History:   Madeleine Koch  has a past surgical history that includes Colonoscopy; Excision of breast lesion; Back surgery (1988); Hysteroscopy with dilation and curettage of uterus (N/A, 04/20/2021); Diagnostic laparoscopy (N/A, 04/20/2021); Carbon dioxide vaginal rejuvenation laser within the vagina (N/A, 05/11/2021); Laser rejuvenation carbon dioxide (N/A, Completion date 07/13/2021); Eye surgery; and Spine surgery (1988).    Medications:   Madeleine has a current medication list which includes the following prescription(s): citalopram, cyclobenzaprine, gabapentin, latanoprost,  lisinopril, meloxicam, naproxen, rosuvastatin, and tolterodine.    Allergies:   Review of patient's allergies indicates:  No Known Allergies     Objective        Posture : Ectomorphic female w/ decreased gluteal mass bilaterally.      Standing Lordosis        []  Increased   []   Decreased   [x]  Within Normal Limits  Sitting Lordosis  []  Increased   []   Decreased   [x]  Within Normal Limits   Iliac Crest Height  []  Left/Right Increased      [x] Equal/Level  PSIS Height    []  Left/Right Increased      [x] Equal/Level  Pelvic Rot/Torsion       []   Yes     [x]   No  Scoliosis    []  Yes   Concave Right/Left      [x]  No  Lateral Shift    []   Right     []   Left          [x]  Within Normal Limits    Range of Motion :     Back :    Forward Flexion  WNL w/ pulling/soreness at end range   Back Bending  WNL w/ slight pain at end range   Side Bending Left  WNL w/ pain in L lateral lumbar spine   Side Bending Right WNL w/ pain in L lateral lumbar spine   Rotation Left   WNL w/ sharp pain in L lateral lumbar spine   Rotation Right  WNL w/ sharp pain in L lateral lumbar spine  HIP:   L Hip ER:   30 deg  L Hip IR:   10 deg      Strength:    Trunk MMT grossly 3/5; unable to perform bilateral leg raise, unable to perform straight leg situp    Hip ER:   3+5 bilaterally  Hip Abduction :  3+/5 bilaterally  Hip extension:  3+/5 bilaterally, unable to perform single leg bridge due to pain    Special Tests :     SLR :   Left : [x] Positive   []  Negative ;  Right : [] Positive   [x]  Negative   Sitting Slump Test :  Left : [x] Positive   []  Negative ;  Right : [] Positive   [x]  Negative   Lower Extremity Length :   [] Right/Left Longer     [x]  Within Normal Limits   APRIL : Left : [x] Positive   []  Negative ;  Right : [] Positive   []  Negative   Positive for femoral nerve tension on L.       Limitation/Restriction for FOTO Lumbar Survey    Therapist reviewed FOTO scores for Madeleine Koch on 11/1/2023.   FOTO documents  entered into [employer] - see Media section.    Intake Score: 42%         Treatment     Total Treatment time (time-based codes) separate from Evaluation: 12 minutes     Madeleine received the treatments listed below:  THERAPEUTIC EXERCISES to develop strength, endurance, ROM, flexibility, posture, and core stabilization for 12 minutes including femoral nerve flossing x10, sciatic nerve flossing x 10, hooklying clamshells [id number], curl ups [id number].       Patient Education and Home Exercises     Education provided:   - Diagnosis, prognosis, plan of care forward    Written Home Exercises Provided: yes. Exercises were reviewed and Madeleine was able to demonstrate them prior to the end of the session.  Madeleine demonstrated good  understanding of the education provided. See EMR under Patient Instructions for exercises provided during therapy sessions.    Assessment     Madeleine is a 68 y.o. female referred to outpatient Physical Therapy with a medical diagnosis of lumbar radiculopathy. Patient presents with signs and symptoms consistent with this diagnosis with decreased trunk stability and significant irritability of femoral nerve and mild irritability of sciatic nerve as well.  With decreased pain free trunk ROM, decreased hip ROM, decreased trunk strength and stability, decreased hip strength, decreased flexibility, decreased functional capacity at this time which we will work to address through skilled therapy at this time.     Patient prognosis is Good.   Patient will benefit from skilled outpatient Physical Therapy to address the deficits stated above and in the chart below, provide patient /family education, and to maximize patientt's level of independence.     Plan of care discussed with patient: Yes  Patient's spiritual, cultural and educational needs considered and patient is agreeable to the plan of care and goals as stated below:     Anticipated Barriers for therapy: possible need for discectomy, prior discectomy, sedentary  lifestyle.     Medical Necessity is demonstrated by the following  History  Co-morbidities and personal factors that may impact the plan of care [] LOW: no personal factors / co-morbidities  [x] MODERATE: 1-2 personal factors / co-morbidities  [] HIGH: 3+ personal factors / co-morbidities    Moderate / High Support Documentation:   Co-morbidities affecting plan of care: possible need for discectomy, prior discectomy, sedentary lifestyle, anxiety, hypertension.     Personal Factors:   lifestyle     Examination  Body Structures and Functions, activity limitations and participation restrictions that may impact the plan of care [] LOW: addressing 1-2 elements  [x] MODERATE: 3+ elements  [] HIGH: 4+ elements (please support below)    Moderate / High Support Documentation: decreased pain free trunk ROM, decreased hip ROM, decreased trunk strength and stability, decreased hip strength, decreased flexibility, decreased functional capacity     Clinical Presentation [] LOW: stable  [x] MODERATE: Evolving  [] HIGH: Unstable     Decision Making/ Complexity Score: moderate       Short Term Goals: 2 weeks   Pt will be independent with HEP for continued progression beyond skilled therapy.  Pt will be able to sleep through the night without waking due to cervical or lumbar pain.  Pt will be able to drive without pain or limitation from cervical region.   Pt will be able to perform a single leg bridge bilaterally without increased low back pain.      Long Term Goals: 4 weeks   Pt will be able to perform a sustained deep cervical endurance test for 30 seconds without increased use of sternocleidomastoids.   Pt will be able to hold a sustained Prone W test for 30 seconds without wrists dropping below elbows to demonstrate sufficiently improved parascapular and posterior cuff endurance.   Pt will be able to lift 45lbs from floor to waist without pain or limitation from lumbar spine.   Pt will be able to return to all prior ADLs without  pain or limitation from R UE or cervical spine.     Plan     Plan of care Certification: 11/1/2023 to 12/1/23.    Outpatient Physical Therapy 2 times weekly for 4 weeks to include the following interventions: Manual Therapy, Neuromuscular Re-ed, Patient Education, Therapeutic Activities, and Therapeutic Exercise.     Oscar Shane, PT

## 2023-11-03 ENCOUNTER — TELEPHONE (OUTPATIENT)
Dept: FAMILY MEDICINE | Facility: CLINIC | Age: 68
End: 2023-11-03
Payer: MEDICARE

## 2023-11-03 ENCOUNTER — PATIENT MESSAGE (OUTPATIENT)
Dept: FAMILY MEDICINE | Facility: CLINIC | Age: 68
End: 2023-11-03
Payer: MEDICARE

## 2023-11-03 NOTE — TELEPHONE ENCOUNTER
Pt left message asking for you to call her about her lab results.  Stated she had some concerns she wanted to discuss with you.  I sent her a message asking if there is anything I can help her with or any specific concerns and am waiting on a reply.

## 2023-11-08 ENCOUNTER — CLINICAL SUPPORT (OUTPATIENT)
Dept: REHABILITATION | Facility: HOSPITAL | Age: 68
End: 2023-11-08
Payer: MEDICARE

## 2023-11-08 DIAGNOSIS — M54.16 LUMBAR RADICULOPATHY: Primary | ICD-10-CM

## 2023-11-08 DIAGNOSIS — M53.2X6 LUMBAR SPINE INSTABILITY: ICD-10-CM

## 2023-11-08 DIAGNOSIS — R53.1 WEAKNESS: ICD-10-CM

## 2023-11-08 PROCEDURE — 97112 NEUROMUSCULAR REEDUCATION: CPT

## 2023-11-08 PROCEDURE — 97530 THERAPEUTIC ACTIVITIES: CPT

## 2023-11-08 PROCEDURE — 97110 THERAPEUTIC EXERCISES: CPT

## 2023-11-08 NOTE — PROGRESS NOTES
Physical Therapy Treatment Note     Name: Madeleine Patel [facility] Number: [id number]    Therapy Diagnosis: No diagnosis found.  Physician: Dionicio Patel MD    Visit Date: 11/8/2023    Physician Orders: PT Eval and Treat  Medical Diagnosis from Referral: lumbar radiculopathy  Evaluation Date: 11/1/2023  Authorization Period Expiration: 10/18/24  Plan of Care Expiration: 12/1/23  Progress Note Due: 12/1/23  Visit # / Visits authorized: 1/ Medicare cap  FOTO: 42 at IE  PTA Visit #: 0/5    Time In: 11:31AM  Time Out: 12:12PM  Total Billable Time: 41 minutes    Precautions: Standard  Functional Level Prior to Evaluation: mod pain in low back and ADL limitations    Subjective     Pt reports: overall feeling pretty good on arrival.  Only intermittently compliant with HEP since eval due to her aunt moving into a new home and being busy helping with the move.  She was compliant with home exercise program.  Response to previous treatment: good initial response  Functional change: decreased pain    Pain: 0/10    Objective     Madeleine received therapeutic exercises to develop strength, endurance, ROM, flexibility, posture, and core stabilization for 25 minutes including:  Lower trunk Rotations [id number] each  Sidelying Clamshells x20 w/ green tb each  Supine DKTC [id number]  Hamstring Curls [id number]  Flex Stretch at Stairs [id number]  Seated Lumbar Flexion Rolls [id number]    Madeleine participated in neuromuscular re-education activities to improve: Coordination, Kinesthetic, Sense, Proprioception, and Posture for 8 minutes. The following activities were included:  Bridges w/ sustained PPT [id number]  Leg Press [id number]      Madeleine participated in dynamic functional therapeutic activities to improve functional performance for 8  minutes, including:  Nu step x 5 min  Sustained PPT w/ breathing control [id number]    Home Exercises Provided and Patient Education Provided     Education provided: Review of HEP and plan forward    Written Home  Exercises Provided: Patient instructed to cont prior HEP.  Exercises were reviewed and Madeleine was able to demonstrate them prior to the end of the session.  Madeleine demonstrated good  understanding of the education provided.     See EMR under Patient Instructions for exercises provided prior visit.    Assessment     Overall doing much better than she did at eval.  Pain much less irritable and she was able to tolerate quite a bit without pain.  Added in several exercises per HEP along with leg press and general LE strengthening.  Felt only fatigue at conclusion of tx.  Expect quite a bit of soreness.  Pt wanting to try 1x per week for now with the holidays coming up which we will comply with.  Will continue with current plan.   Madeleine Is progressing well towards her goals.   Pt prognosis is Good.     Pt will continue to benefit from skilled outpatient physical therapy to address the deficits listed in the problem list box on initial evaluation, provide pt/family education and to maximize pt's level of independence in the home and community environment.     Pt's spiritual, cultural and educational needs considered and pt agreeable to plan of care and goals.     Anticipated barriers to physical therapy: possible need for discectomy, prior discectomy, sedentary lifestyle.     Short Term Goals: 2 weeks   Pt will be independent with HEP for continued progression beyond skilled therapy.  Pt will be able to sleep through the night without waking due to cervical or lumbar pain.  Pt will be able to drive without pain or limitation from cervical region.   Pt will be able to perform a single leg bridge bilaterally without increased low back pain.      Long Term Goals: 4 weeks   Pt will be able to perform a sustained deep cervical endurance test for 30 seconds without increased use of sternocleidomastoids.   Pt will be able to hold a sustained Prone W test for 30 seconds without wrists dropping below elbows to demonstrate  sufficiently improved parascapular and posterior cuff endurance.   Pt will be able to lift 45lbs from floor to waist without pain or limitation from lumbar spine.   Pt will be able to return to all prior ADLs without pain or limitation from R UE or cervical spine.      Plan      Plan of care Certification: 11/1/2023 to 12/1/23.     Outpatient Physical Therapy 2 times weekly for 4 weeks to include the following interventions: Manual Therapy, Neuromuscular Re-ed, Patient Education, Therapeutic Activities, and Therapeutic Exercise.     Oscar Shane, PT  11/8/2023

## 2023-11-09 DIAGNOSIS — Z71.89 COMPLEX CARE COORDINATION: ICD-10-CM

## 2023-11-09 NOTE — PROGRESS NOTES
I left a detailed VM letting pt know this and addressing her VM asking for a return call to discuss lab results.  I read the results to her and let her know she can reach out via phone or my chart if continues to have questions.  I asked about gyn/urology past work up too.

## 2023-11-13 ENCOUNTER — CLINICAL SUPPORT (OUTPATIENT)
Dept: REHABILITATION | Facility: HOSPITAL | Age: 68
End: 2023-11-13
Payer: MEDICARE

## 2023-11-13 DIAGNOSIS — R53.1 WEAKNESS: ICD-10-CM

## 2023-11-13 DIAGNOSIS — M54.16 LUMBAR RADICULOPATHY: Primary | ICD-10-CM

## 2023-11-13 DIAGNOSIS — M53.2X6 LUMBAR SPINE INSTABILITY: ICD-10-CM

## 2023-11-13 PROCEDURE — 97530 THERAPEUTIC ACTIVITIES: CPT

## 2023-11-13 PROCEDURE — 97110 THERAPEUTIC EXERCISES: CPT

## 2023-11-13 PROCEDURE — 97112 NEUROMUSCULAR REEDUCATION: CPT

## 2023-11-13 NOTE — PROGRESS NOTES
Physical Therapy Treatment Note     Name: Madeleine Patel [facility] Number: [id number]    Therapy Diagnosis: No diagnosis found.  Physician: Dionicio Patel MD    Visit Date: 11/13/2023    Physician Orders: PT Eval and Treat  Medical Diagnosis from Referral: lumbar radiculopathy  Evaluation Date: 11/1/2023  Authorization Period Expiration: 10/18/24  Plan of Care Expiration: 12/1/23  Progress Note Due: 12/1/23  Visit # / Visits authorized: 1/ Medicare cap  FOTO: 42 at IE  PTA Visit #: 0/5    Time In: 01:35PM  Time Out: 02:15PM  Total Billable Time: 40 minutes    Precautions: Standard  Functional Level Prior to Evaluation: mod pain in low back and ADL limitations    Subjective     Pt reports: reports her back was bothering her a bit this morning after she had been cleaning out her shoe closet, but pain greatly eased with HEP activities.   She was compliant with home exercise program.  Response to previous treatment: good initial response  Functional change: decreased pain    Pain: 0/10    Objective     Madeleine received therapeutic exercises to develop strength, endurance, ROM, flexibility, posture, and core stabilization for 24 minutes including:  Lower trunk Rotations [id number] each  Sidelying Clamshells x20 w/ green tb each  Supine DKTC [id number]  Flex Stretch at Stairs [id number]  Seated Lumbar Flexion Rolls [id number]  Douglas stretch x 1 min each side  Standing Hip Extension x 20 each at squat bar    Madeleine participated in neuromuscular re-education activities to improve: Coordination, Kinesthetic, Sense, Proprioception, and Posture for 8 minutes. The following activities were included:  Bridges w/ sustained PPT [id number]  Leg Press [id number]  TRX Squats 3x10 w/ chair behind      Madeleine participated in dynamic functional therapeutic activities to improve functional performance for 8  minutes, including:  Nu step x 6 min  Sustained PPT w/ breathing control [id number]    Home Exercises Provided and Patient Education Provided      Education provided: Review of HEP and plan forward    Written Home Exercises Provided: Patient instructed to cont prior HEP.  Exercises were reviewed and Madeleine was able to demonstrate them prior to the end of the session.  Madeleine demonstrated good  understanding of the education provided.     See EMR under Patient Instructions for exercises provided prior visit.    Assessment     Overall, continues to seem to be making good progress here.  Decreased irritability of symptoms at this point and doing well with all exercises.  More experiencing fatigue and deconditioning at this point than any radicular symptoms.  Felt good at conclusion of tx, only noting fatigue.  Will continue with current plan.     Madeleine Is progressing well towards her goals.   Pt prognosis is Good.     Pt will continue to benefit from skilled outpatient physical therapy to address the deficits listed in the problem list box on initial evaluation, provide pt/family education and to maximize pt's level of independence in the home and community environment.     Pt's spiritual, cultural and educational needs considered and pt agreeable to plan of care and goals.     Anticipated barriers to physical therapy: possible need for discectomy, prior discectomy, sedentary lifestyle.     Short Term Goals: 2 weeks   Pt will be independent with HEP for continued progression beyond skilled therapy.  Pt will be able to sleep through the night without waking due to cervical or lumbar pain.  Pt will be able to drive without pain or limitation from cervical region.   Pt will be able to perform a single leg bridge bilaterally without increased low back pain.      Long Term Goals: 4 weeks   Pt will be able to perform a sustained deep cervical endurance test for 30 seconds without increased use of sternocleidomastoids.   Pt will be able to hold a sustained Prone W test for 30 seconds without wrists dropping below elbows to demonstrate sufficiently improved  parascapular and posterior cuff endurance.   Pt will be able to lift 45lbs from floor to waist without pain or limitation from lumbar spine.   Pt will be able to return to all prior ADLs without pain or limitation from R UE or cervical spine.      Plan      Plan of care Certification: 11/1/2023 to 12/1/23.     Outpatient Physical Therapy 2 times weekly for 4 weeks to include the following interventions: Manual Therapy, Neuromuscular Re-ed, Patient Education, Therapeutic Activities, and Therapeutic Exercise.     Oscar Shane, PT  11/13/2023

## 2023-11-14 ENCOUNTER — PATIENT MESSAGE (OUTPATIENT)
Dept: ADMINISTRATIVE | Facility: HOSPITAL | Age: 68
End: 2023-11-14

## 2023-11-15 ENCOUNTER — PATIENT OUTREACH (OUTPATIENT)
Dept: FAMILY MEDICINE | Facility: CLINIC | Age: 68
End: 2023-11-15
Payer: MEDICARE

## 2023-11-15 ENCOUNTER — PATIENT OUTREACH (OUTPATIENT)
Dept: ADMINISTRATIVE | Facility: HOSPITAL | Age: 68
End: 2023-11-15

## 2023-11-15 DIAGNOSIS — Z12.39 ENCOUNTER FOR SCREENING FOR MALIGNANT NEOPLASM OF BREAST, UNSPECIFIED SCREENING MODALITY: Primary | ICD-10-CM

## 2023-11-15 NOTE — PROGRESS NOTES
Population Health Chart Review & Patient Outreach Details    Health Maintenance Topics Addressed and Outreach Outcomes / Actions Taken:           Breast Cancer Screening [x] Responded to Campaign Outreach to be scheduled for a Mammogram. Order placed on 10/20/23. Reached out to Referral Team for assistance with scheduling.

## 2023-11-25 NOTE — PROGRESS NOTES
Subjective     Patient ID: Madeleine Koch is a 68 y.o. female.    Chief Complaint: Annual Exam    69 yo WF here for check up and lab/ Has c-scope for February.       Review of Systems   Constitutional:  Negative for activity change.   Eyes:  Negative for visual disturbance.   Respiratory:  Negative for shortness of breath.    Cardiovascular:  Negative for chest pain.   Gastrointestinal:  Negative for abdominal pain.   Neurological:  Negative for headaches.   Psychiatric/Behavioral:  Negative for dysphoric mood.      Office Visit on 10/20/2023   Component Date Value Ref Range Status    Color, UA 10/20/2023 Light-Yellow  Colorless, Straw, Yellow, Light Yellow, Dark Yellow Final    Clarity, UA 10/20/2023 Clear  Clear Final    pH, UA 10/20/2023 5.0  5.0 to 8.0 pH Units Final    Leukocytes, UA 10/20/2023 Small (A)  Negative Final    Nitrites, UA 10/20/2023 Negative  Negative Final    Protein, UA 10/20/2023 Negative  Negative Final    Glucose, UA 10/20/2023 Normal  Normal mg/dL Final    Ketones, UA 10/20/2023 Negative  Negative mg/dL Final    Urobilinogen, UA 10/20/2023 Normal  0.2, 1.0, Normal mg/dL Final    Bilirubin, UA 10/20/2023 Negative  Negative Final    Blood, UA 10/20/2023 Moderate (A)  Negative Final    Specific Gravity, UA 10/20/2023 1.024  <=1.030 Final    Triglycerides 10/20/2023 61  35 - 150 mg/dL Final      Normal:  <150 mg/dL  Borderline High: 150-199 mg/dL  High:   200-499 mg/dL  Very High:  >=500    Cholesterol 10/20/2023 157  0 - 200 mg/dL Final      <200 mg/dL:  Desirable  200-240 mg/dL: Borderline High  >240 mg/dL:  High    HDL Cholesterol 10/20/2023 65 (H)  40 - 60 mg/dL Final      <40 mg/dL: Low HDL  40-60 mg/dL: Normal  >60 mg/dL: Desirable    Cholesterol/HDL Ratio (Risk Factor) 10/20/2023 2.4   Final    Non-HDL 10/20/2023 92  mg/dL Final    LDL Calculated 10/20/2023 80  mg/dL Final    Unable to calculate due to one of the following values:  Cholesterol <5  HDL Cholesterol <5  Triglycerides <10  or >400    LDL/HDL 10/20/2023 1.2   Final    Unable to calculate due to one of the following values:  Cholesterol <5  HDL Cholesterol <5  Triglycerides <10 or >400    VLDL 10/20/2023 12  mg/dL Final    TSH 10/20/2023 0.398  0.358 - 3.740 uIU/mL Final    Vitamin D 25-Hydroxy, Blood 10/20/2023 20.6  ng/mL Final    Vitamin D 25-OH Adult Reference Values:  Deficiency: <20 ng/mL  Insufficiency: 20 - <30 ng/mL  Sufficiency: 30 -100 ng/mL    Vitamin D 25-OH Pediatric Reference Values:  Deficiency: <15 ng/mL  Insufficiency: 15 - <20 ng/mL  Sufficiency: 20 - 100 ng/mL    Sodium 10/20/2023 141  136 - 145 mmol/L Final    Potassium 10/20/2023 4.2  3.5 - 5.1 mmol/L Final    Chloride 10/20/2023 110 (H)  98 - 107 mmol/L Final    CO2 10/20/2023 26  21 - 32 mmol/L Final    Anion Gap 10/20/2023 9  7 - 16 mmol/L Final    Glucose 10/20/2023 94  74 - 106 mg/dL Final    BUN 10/20/2023 15  7 - 18 mg/dL Final    Creatinine 10/20/2023 0.95  0.55 - 1.02 mg/dL Final    BUN/Creatinine Ratio 10/20/2023 16  6 - 20 Final    Calcium 10/20/2023 9.4  8.5 - 10.1 mg/dL Final    Total Protein 10/20/2023 7.0  6.4 - 8.2 g/dL Final    Albumin 10/20/2023 4.0  3.5 - 5.0 g/dL Final    Globulin 10/20/2023 3.0  2.0 - 4.0 g/dL Final    A/G Ratio 10/20/2023 1.3   Final    Bilirubin, Total 10/20/2023 1.1  >0.0 - 1.2 mg/dL Final    Alk Phos 10/20/2023 74  55 - 142 U/L Final    ALT 10/20/2023 28  13 - 56 U/L Final    AST 10/20/2023 19  15 - 37 U/L Final    eGFR 10/20/2023 66  >=60 mL/min/1.73m2 Final    WBC 10/20/2023 5.76  4.50 - 11.00 K/uL Final    RBC 10/20/2023 4.47  4.20 - 5.40 M/uL Final    Hemoglobin 10/20/2023 13.3  12.0 - 16.0 g/dL Final    Hematocrit 10/20/2023 39.7  38.0 - 47.0 % Final    MCV 10/20/2023 88.8  80.0 - 96.0 fL Final    MCH 10/20/2023 29.8  27.0 - 31.0 pg Final    MCHC 10/20/2023 33.5  32.0 - 36.0 g/dL Final    RDW 10/20/2023 12.3  11.5 - 14.5 % Final    Platelet Count 10/20/2023 236  150 - 400 K/uL Final    MPV 10/20/2023 10.7  9.4 - 12.4  "fL Final    Neutrophils % 10/20/2023 58.5  53.0 - 65.0 % Final    Lymphocytes % 10/20/2023 26.2 (L)  27.0 - 41.0 % Final    Monocytes % 10/20/2023 10.4 (H)  2.0 - 6.0 % Final    Eosinophils % 10/20/2023 3.8  1.0 - 4.0 % Final    Basophils % 10/20/2023 0.9  0.0 - 1.0 % Final    Immature Granulocytes % 10/20/2023 0.2  0.0 - 0.4 % Final    nRBC, Auto 10/20/2023 0.0  <=0.0 % Final    Neutrophils, Abs 10/20/2023 3.37  1.80 - 7.70 K/uL Final    Lymphocytes, Absolute 10/20/2023 1.51  1.00 - 4.80 K/uL Final    Monocytes, Absolute 10/20/2023 0.60  0.00 - 0.80 K/uL Final    Eosinophils, Absolute 10/20/2023 0.22  0.00 - 0.50 K/uL Final    Basophils, Absolute 10/20/2023 0.05  0.00 - 0.20 K/uL Final    Immature Granulocytes, Absolute 10/20/2023 0.01  0.00 - 0.04 K/uL Final    nRBC, Absolute 10/20/2023 0.00  <=0.00 x10e3/uL Final    Diff Type 10/20/2023 Auto   Final    Hepatitis C Ab 10/20/2023 Non-Reactive  Non-Reactive Final    WBC, UA 10/20/2023 7 (H)  <=5 /hpf Final    RBC, UA 10/20/2023 20 (H)  <=3 /hpf Final    Bacteria, UA 10/20/2023 Few (A)  None Seen /hpf Final    Squamous Epithelial Cells, UA 10/20/2023 Occasional (A)  None Seen /HPF Final    Mucous 10/20/2023 Occasional (A)  None Seen /LPF Final          Objective   Blood pressure (!) 144/89, pulse 67, temperature 98.4 °F (36.9 °C), temperature source Oral, resp. rate 18, height 5' 1" (1.549 m), weight 56.2 kg (124 lb), SpO2 98 %.    Physical Exam  Constitutional:       Appearance: Normal appearance.   HENT:      Head: Normocephalic and atraumatic.      Right Ear: External ear normal.      Left Ear: External ear normal.      Nose: Nose normal.      Mouth/Throat:      Mouth: Mucous membranes are moist.   Eyes:      Conjunctiva/sclera: Conjunctivae normal.      Pupils: Pupils are equal, round, and reactive to light.   Cardiovascular:      Rate and Rhythm: Normal rate and regular rhythm.      Pulses: Normal pulses.      Heart sounds: Normal heart sounds.   Pulmonary:      " Effort: Pulmonary effort is normal.      Breath sounds: Normal breath sounds.   Abdominal:      General: Abdomen is flat.      Palpations: Abdomen is soft.   Musculoskeletal:         General: Normal range of motion.      Cervical back: Normal range of motion and neck supple.   Skin:     General: Skin is warm and dry.   Neurological:      General: No focal deficit present.      Mental Status: She is alert and oriented to person, place, and time.   Psychiatric:         Mood and Affect: Mood normal.         Behavior: Behavior normal.         Thought Content: Thought content normal.         Judgment: Judgment normal.            Assessment and Plan     1. Primary hypertension  -     CBC Auto Differential; Future; Expected date: 10/20/2023  -     Lipid Panel; Future; Expected date: 10/21/2023  -     TSH; Future; Expected date: 10/20/2023  -     Urinalysis, Reflex to Urine Culture  -     Comprehensive Metabolic Panel; Future; Expected date: 10/20/2023  -     Urinalysis, Microscopic    2. Vitamin D deficiency  -     Vitamin D; Future; Expected date: 10/20/2023    3. Essential (primary) hypertension  -     CBC Auto Differential; Future; Expected date: 10/20/2023  -     Lipid Panel; Future; Expected date: 10/21/2023  -     TSH; Future; Expected date: 10/20/2023  -     Urinalysis, Reflex to Urine Culture  -     Comprehensive Metabolic Panel; Future; Expected date: 10/20/2023  -     Urinalysis, Microscopic    4. Pure hypercholesterolemia  -     CBC Auto Differential; Future; Expected date: 10/20/2023  -     Lipid Panel; Future; Expected date: 10/21/2023  -     TSH; Future; Expected date: 10/20/2023  -     Urinalysis, Reflex to Urine Culture  -     Comprehensive Metabolic Panel; Future; Expected date: 10/20/2023  -     Urinalysis, Microscopic    5. Screening for osteoporosis  -     DXA Bone Density Axial Skeleton 1 or more sites; Future; Expected date: 10/20/2023    6. Menopause  -     DXA Bone Density Axial Skeleton 1 or more  sites; Future; Expected date: 10/20/2023    7. Encounter for screening mammogram for malignant neoplasm of breast  -     Mammo Digital Screening Bilat; Future; Expected date: 10/20/2023    8. Need for hepatitis C screening test  -     Hepatitis C Antibody; Future; Expected date: 10/20/2023    9. Need for vaccination  -     (In Office Administered) Pneumococcal Conjugate Vaccine (20 Valent) (IM) (Preferred)  -     Influenza - Quadrivalent (PF)    10. Anxiety    11. OAB (overactive bladder)  -     tolterodine (DETROL LA) 4 MG 24 hr capsule; Take 1 capsule (4 mg total) by mouth once daily.  Dispense: 90 capsule; Refill: 3    Other orders  -     citalopram (CELEXA) 20 MG tablet; Take 1 tablet (20 mg total) by mouth once daily.  Dispense: 90 tablet; Refill: 3  -     cyclobenzaprine (FLEXERIL) 10 MG tablet; Take 1 tablet (10 mg total) by mouth 3 (three) times daily as needed for Muscle spasms.  Dispense: 270 tablet; Refill: 3      RTC prn.          Follow up in about 1 year (around 10/20/2024) for for check up and fasting lab. .

## 2024-02-08 ENCOUNTER — ANESTHESIA EVENT (OUTPATIENT)
Dept: GASTROENTEROLOGY | Facility: HOSPITAL | Age: 69
End: 2024-02-08
Payer: MEDICARE

## 2024-02-08 ENCOUNTER — ANESTHESIA (OUTPATIENT)
Dept: GASTROENTEROLOGY | Facility: HOSPITAL | Age: 69
End: 2024-02-08
Payer: MEDICARE

## 2024-02-08 ENCOUNTER — HOSPITAL ENCOUNTER (OUTPATIENT)
Dept: GASTROENTEROLOGY | Facility: HOSPITAL | Age: 69
Discharge: HOME | End: 2024-02-08
Attending: FAMILY MEDICINE
Payer: MEDICARE

## 2024-02-08 VITALS
TEMPERATURE: 97 F | HEART RATE: 74 BPM | SYSTOLIC BLOOD PRESSURE: 113 MMHG | RESPIRATION RATE: 13 BRPM | DIASTOLIC BLOOD PRESSURE: 68 MMHG | OXYGEN SATURATION: 96 %

## 2024-02-08 DIAGNOSIS — Z12.11 SCREENING FOR MALIGNANT NEOPLASM OF COLON: ICD-10-CM

## 2024-02-08 DIAGNOSIS — Z12.11 COLON CANCER SCREENING: Primary | ICD-10-CM

## 2024-02-08 PROCEDURE — 37000009 HC ANESTHESIA EA ADD 15 MINS

## 2024-02-08 PROCEDURE — 88305 TISSUE EXAM BY PATHOLOGIST: CPT | Mod: TC,SUR | Performed by: INTERNAL MEDICINE

## 2024-02-08 PROCEDURE — 88305 TISSUE EXAM BY PATHOLOGIST: CPT | Mod: 26,,, | Performed by: PATHOLOGY

## 2024-02-08 PROCEDURE — 45380 COLONOSCOPY AND BIOPSY: CPT | Mod: 59,PT,, | Performed by: INTERNAL MEDICINE

## 2024-02-08 PROCEDURE — 45385 COLONOSCOPY W/LESION REMOVAL: CPT | Mod: PT,,, | Performed by: INTERNAL MEDICINE

## 2024-02-08 PROCEDURE — 45385 COLONOSCOPY W/LESION REMOVAL: CPT | Mod: PT | Performed by: INTERNAL MEDICINE

## 2024-02-08 PROCEDURE — 45380 COLONOSCOPY AND BIOPSY: CPT | Mod: 59,PT | Performed by: INTERNAL MEDICINE

## 2024-02-08 PROCEDURE — 37000008 HC ANESTHESIA 1ST 15 MINUTES

## 2024-02-08 PROCEDURE — 25000003 PHARM REV CODE 250: Performed by: NURSE ANESTHETIST, CERTIFIED REGISTERED

## 2024-02-08 PROCEDURE — D9220A PRA ANESTHESIA: Mod: PT,,, | Performed by: NURSE ANESTHETIST, CERTIFIED REGISTERED

## 2024-02-08 PROCEDURE — 27201423 OPTIME MED/SURG SUP & DEVICES STERILE SUPPLY

## 2024-02-08 PROCEDURE — 63600175 PHARM REV CODE 636 W HCPCS: Performed by: NURSE ANESTHETIST, CERTIFIED REGISTERED

## 2024-02-08 RX ORDER — SODIUM CHLORIDE 0.9 % (FLUSH) 0.9 %
10 SYRINGE (ML) INJECTION
Status: DISCONTINUED | OUTPATIENT
Start: 2024-02-08 | End: 2024-02-09 | Stop reason: HOSPADM

## 2024-02-08 RX ORDER — PROPOFOL 10 MG/ML
VIAL (ML) INTRAVENOUS
Status: DISCONTINUED | OUTPATIENT
Start: 2024-02-08 | End: 2024-02-08

## 2024-02-08 RX ORDER — LIDOCAINE HYDROCHLORIDE 20 MG/ML
INJECTION, SOLUTION EPIDURAL; INFILTRATION; INTRACAUDAL; PERINEURAL
Status: DISCONTINUED | OUTPATIENT
Start: 2024-02-08 | End: 2024-02-08

## 2024-02-08 RX ADMIN — PROPOFOL 50 MG: 10 INJECTION, EMULSION INTRAVENOUS at 10:02

## 2024-02-08 RX ADMIN — LIDOCAINE HYDROCHLORIDE 50 MG: 20 INJECTION, SOLUTION INTRAVENOUS at 10:02

## 2024-02-08 RX ADMIN — SODIUM CHLORIDE: 9 INJECTION, SOLUTION INTRAVENOUS at 10:02

## 2024-02-08 NOTE — ANESTHESIA POSTPROCEDURE EVALUATION
Anesthesia Post Evaluation    Patient: Madeleine Koch    Procedure(s) Performed:Colonoscopy    Final Anesthesia Type: general      Patient location during evaluation: GI PACU  Patient participation: Yes- Able to Participate  Level of consciousness: awake and alert and oriented  Post-procedure vital signs: reviewed and stable  Pain management: adequate  Airway patency: patent    PONV status at discharge: No PONV  Anesthetic complications: no      Cardiovascular status: blood pressure returned to baseline and stable  Respiratory status: unassisted, spontaneous ventilation and room air  Hydration status: euvolemic  Follow-up not needed.  Comments: Refer to nursing note for pain/torres score upon discharge from recovery.               Vitals Value Taken Time   BP 95/56 02/08/24 1058   Temp 36 02/08/24 1058   Pulse 72 02/08/24 1058   Resp 14 02/08/24 1058   SpO2 100 02/08/24 1058         No case tracking events are documented in the log.      Pain/Torres Score: No data recorded

## 2024-02-08 NOTE — H&P
Gastroenterology Pre-procedure H&P    Chief Complaint: Colon cancer screening    History of Present Illness    Madeleine Koch is a 69 y.o. female that  has a past medical history of Abnormal vaginal bleeding with endometrial thickness greater than 5 mm present on transvaginal ultrasound in postmenopausal patient (10/01/2020), Atrophic vaginitis (10/11/2016), Depression, Dysuria, Essential hypertension, Hyperlipemia, UTI (urinary tract infection), and Yeast vaginitis (10/11/2016).     Patient here for routine screening     Patient denies wt loss, abdominal pain, diarrhea, melena/hematochezia, change in stool caliber, no anticoagulants, FMHx of GI related malignancies.      Past Medical History:   Diagnosis Date    Abnormal vaginal bleeding with endometrial thickness greater than 5 mm present on transvaginal ultrasound in postmenopausal patient 10/01/2020    Atrophic vaginitis 10/11/2016    Depression     Dysuria     Essential hypertension     Hyperlipemia     UTI (urinary tract infection)     Yeast vaginitis 10/11/2016       Past Surgical History:   Procedure Laterality Date    BACK SURGERY  1988    Carbon dioxide vaginal rejuvenation laser within the vagina N/A 05/11/2021    Procedure of 1/3 steps performed today at [facility] [employer] [facility] for severe atrophic vaginitis/dyspareunia    COLONOSCOPY      DIAGNOSTIC LAPAROSCOPY N/A 04/20/2021    Procedure: LAPAROSCOPY, DIAGNOSTIC;  Surgeon: Dedrick Dunbar MD;  Location: [facility] OR;  Service: OB/GYN;  Laterality: N/A;    EXCISION OF BREAST LESION      EYE SURGERY      5 yrs ago    HYSTEROSCOPY WITH DILATION AND CURETTAGE OF UTERUS N/A 04/20/2021    Procedure: HYSTEROSCOPY, WITH DILATION AND CURETTAGE OF UTERUS;  Surgeon: Dedrick Dunbar MD;  Location: [facility] OR;  Service: OB/GYN;  Laterality: N/A;    Laser rejuvenation carbon dioxide N/A Completion date 07/13/2021    Laser carbon dioxide rejuvenation performed for vaginal contracture atrophic vaginitis-90%  improved    SPINE SURGERY         Family History   Problem Relation Age of Onset    Stroke Paternal Grandfather     Hepatitis Maternal Grandmother     Heart disease Father     Hypertension Father     Hypertension Mother        Social History     Socioeconomic History    Marital status:    Occupational History    Occupation: retired   Tobacco Use    Smoking status: Former     Current packs/day: 0.00     Average packs/day: 1 pack/day for 30.0 years (30.0 ttl pk-yrs)     Types: Cigarettes     Start date: 1975     Quit date: 2005     Years since quittin.4     Passive exposure: Past    Smokeless tobacco: Never    Tobacco comments:     Stopped smoking    Substance and Sexual Activity    Alcohol use: Not Currently    Drug use: Never    Sexual activity: Yes     Partners: Male     Birth control/protection: None       Current Outpatient Medications   Medication Sig Dispense Refill    citalopram (CELEXA) 20 MG tablet Take 1 tablet (20 mg total) by mouth once daily. 90 tablet 3    cyclobenzaprine (FLEXERIL) 10 MG tablet Take 1 tablet (10 mg total) by mouth 3 (three) times daily as needed for Muscle spasms. 270 tablet 3    gabapentin (NEURONTIN) 100 MG capsule Take 1 capsule (100 mg total) by mouth 3 (three) times daily. 90 capsule 0    latanoprost 0.005 % ophthalmic solution Place 1 drop into both eyes every evening.      lisinopriL (PRINIVIL,ZESTRIL) 20 MG tablet TAKE 1 TABLET DAILY 90 tablet 3    meloxicam (MOBIC) 15 MG tablet TAKE 1 TABLET DAILY AS NEEDED FOR PAIN 90 tablet 3    naproxen (NAPROSYN) 500 MG tablet Take 1 tablet (500 mg total) by mouth 2 (two) times daily with meals. 60 tablet 0    rosuvastatin (CRESTOR) 10 MG tablet TAKE 1 TABLET DAILY 90 tablet 3    tolterodine (DETROL LA) 4 MG 24 hr capsule Take 1 capsule (4 mg total) by mouth once daily. 90 capsule 3     No current facility-administered medications for this encounter.       Review of patient's allergies indicates:  No Known  Allergies    Objective:  There were no vitals filed for this visit.     GEN: normal appearing, NAD, AAO x3  HENT: NCAT, anicteric, OP benign  CV: normal rate, regular rhythm  RESP: NABS, symmetric rise, unlabored  ABD: soft, ND, no guarding or TTP  SKIN: warm and dry  NEURO: grossly afocal    Assessment and Plan:    Proceed with:    Colonoscopy for screening for colon cancer    Bob Aaron MD  Gastroenterology

## 2024-02-08 NOTE — TRANSFER OF CARE
Anesthesia Transfer of Care Note    Patient: Madeleine Koch    Procedure(s) Performed: Colonoscopy    Patient location: GI    Anesthesia Type: general    Transport from OR: Transported from OR on room air with adequate spontaneous ventilation. Continuous ECG monitoring in transport. Continuous SpO2 monitoring in transport    Post pain: adequate analgesia    Post assessment: no apparent anesthetic complications    Post vital signs: stable    Level of consciousness: responds to stimulation, awake and sedated    Nausea/Vomiting: no nausea/vomiting    Complications: none    Transfer of care protocol was followed      Last vitals: Visit Vitals  BP (!) 95/56 (BP Location: Right arm, Patient Position: Lying)   Pulse 71   Temp 36 °C (96.8 °F) (Oral)   Resp 16   SpO2 100%   Breastfeeding No

## 2024-02-08 NOTE — DISCHARGE INSTRUCTIONS
Procedure Date  2/8/24     Impression  Overall Impression:   2 subcentimeter polyps in the cecum and sigmoid colon were removed with cold forceps biopsy and cold snare  The ileocecal valve, ascending colon, transverse colon and descending colon appeared normal.  (grade 2) hemorrhoids        Recommendation    Await pathology results     Repeat colonoscopy in 7 years         Outcome of procedure: successful Colonoscopy  Disposition: patient to recovery following procedure; discharge to home when appropriate parameters met  Provisions for follow up: please call my office for any unexpected symptoms like chest or abdominal pain or bleeding following your procedure.  Final Diagnosis: colon polyps     No driving today, no operating heavy machinery, no signing any legal documents until tomorrow.    Drink lots of fluids, resume regular diet.  Take your normal medications.     Please call our office for any nausea, vomiting or abdominal pain.

## 2024-02-08 NOTE — ANESTHESIA PREPROCEDURE EVALUATION
02/08/2024  Madeleine Koch is a 69 y.o., female.      Pre-op Assessment    I have reviewed the Patient Summary Reports.     I have reviewed the Nursing Notes. I have reviewed the NPO Status.   I have reviewed the Medications.     Review of Systems  Anesthesia Hx:  No problems with previous Anesthesia             Denies Family Hx of Anesthesia complications.    Denies Personal Hx of Anesthesia complications.                    Social:  Former Smoker       Hematology/Oncology:  Hematology Normal   Oncology Normal                                   EENT/Dental:  EENT/Dental Normal  TMJ          Cardiovascular:     Hypertension           hyperlipidemia   ECG has been reviewed.                    Hypertension         Pulmonary:  Pulmonary Normal                       Renal/:  Renal/ Normal    OAB             Hepatic/GI:  Bowel Prep.                Musculoskeletal:  Musculoskeletal Normal                Neurological:  Neurology Normal                                      Dermatological:  Skin Normal    Psych:  Psychiatric History anxiety depression              Physical Exam  General: Well nourished    Airway:  Mallampati: II   Mouth Opening: Normal  TM Distance: Normal  Tongue: Normal  Neck ROM: Normal ROM    Dental:  Intact    Anesthesia Plan  Type of Anesthesia, risks & benefits discussed:    Anesthesia Type: Gen Natural Airway  Intra-op Monitoring Plan: Standard ASA Monitors  Post Op Pain Control Plan: multimodal analgesia  Induction:  IV  Informed Consent: Informed consent signed with the Patient and all parties understand the risks and agree with anesthesia plan.  All questions answered. Patient consented to blood products? Yes  ASA Score: 3  Day of Surgery Review of History & Physical: H&P Update referred to the surgeon/provider.I have interviewed and examined the patient. I have reviewed the patient's  H&P dated: There are no significant changes.     Ready For Surgery From Anesthesia Perspective.   .

## 2024-02-09 DIAGNOSIS — R13.19 ESOPHAGEAL DYSPHAGIA: Primary | ICD-10-CM

## 2024-02-09 LAB
ESTROGEN SERPL-MCNC: NORMAL PG/ML
INSULIN SERPL-ACNC: NORMAL U[IU]/ML
LAB AP GROSS DESCRIPTION: NORMAL
LAB AP LABORATORY NOTES: NORMAL
T3RU NFR SERPL: NORMAL %

## 2024-02-09 NOTE — PROGRESS NOTES
Dr. Caal, thank you for referring this patient to me. I recommend repeat colonoscopy in 7 years. Please let me know if you have any questions regarding this patient.

## 2024-02-09 NOTE — PROGRESS NOTES
After her colonoscopy, patient reported to me that she was having dysphagia to solids for some time now. Will schedule her for EGD with potential dilation.     Sincerely,  Bob Aaron MD  Gastroenterology

## 2024-04-01 ENCOUNTER — ANESTHESIA EVENT (OUTPATIENT)
Dept: GASTROENTEROLOGY | Facility: HOSPITAL | Age: 69
End: 2024-04-01
Payer: MEDICARE

## 2024-04-01 ENCOUNTER — HOSPITAL ENCOUNTER (OUTPATIENT)
Dept: GASTROENTEROLOGY | Facility: HOSPITAL | Age: 69
Discharge: HOME | End: 2024-04-01
Attending: INTERNAL MEDICINE | Admitting: INTERNAL MEDICINE
Payer: MEDICARE

## 2024-04-01 ENCOUNTER — ANESTHESIA (OUTPATIENT)
Dept: GASTROENTEROLOGY | Facility: HOSPITAL | Age: 69
End: 2024-04-01
Payer: MEDICARE

## 2024-04-01 VITALS
BODY MASS INDEX: 23.41 KG/M2 | RESPIRATION RATE: 9 BRPM | HEART RATE: 69 BPM | DIASTOLIC BLOOD PRESSURE: 76 MMHG | WEIGHT: 124 LBS | OXYGEN SATURATION: 98 % | SYSTOLIC BLOOD PRESSURE: 119 MMHG | HEIGHT: 61 IN | TEMPERATURE: 98 F

## 2024-04-01 DIAGNOSIS — R13.19 ESOPHAGEAL DYSPHAGIA: ICD-10-CM

## 2024-04-01 PROCEDURE — 43248 EGD GUIDE WIRE INSERTION: CPT | Mod: ,,, | Performed by: INTERNAL MEDICINE

## 2024-04-01 PROCEDURE — 43239 EGD BIOPSY SINGLE/MULTIPLE: CPT | Mod: 59,,, | Performed by: INTERNAL MEDICINE

## 2024-04-01 PROCEDURE — 88305 TISSUE EXAM BY PATHOLOGIST: CPT | Mod: 26,,, | Performed by: PATHOLOGY

## 2024-04-01 PROCEDURE — D9220A PRA ANESTHESIA: Mod: ,,,

## 2024-04-01 PROCEDURE — 37000009 HC ANESTHESIA EA ADD 15 MINS

## 2024-04-01 PROCEDURE — 43248 EGD GUIDE WIRE INSERTION: CPT | Performed by: INTERNAL MEDICINE

## 2024-04-01 PROCEDURE — 63600175 PHARM REV CODE 636 W HCPCS

## 2024-04-01 PROCEDURE — 27201423 OPTIME MED/SURG SUP & DEVICES STERILE SUPPLY

## 2024-04-01 PROCEDURE — 88342 IMHCHEM/IMCYTCHM 1ST ANTB: CPT | Mod: 26,,, | Performed by: PATHOLOGY

## 2024-04-01 PROCEDURE — 37000008 HC ANESTHESIA 1ST 15 MINUTES

## 2024-04-01 PROCEDURE — 43239 EGD BIOPSY SINGLE/MULTIPLE: CPT | Mod: 59 | Performed by: INTERNAL MEDICINE

## 2024-04-01 PROCEDURE — 88342 IMHCHEM/IMCYTCHM 1ST ANTB: CPT | Mod: TC,SUR | Performed by: INTERNAL MEDICINE

## 2024-04-01 PROCEDURE — 25000003 PHARM REV CODE 250

## 2024-04-01 RX ORDER — LIDOCAINE HYDROCHLORIDE 20 MG/ML
INJECTION, SOLUTION EPIDURAL; INFILTRATION; INTRACAUDAL; PERINEURAL
Status: DISCONTINUED | OUTPATIENT
Start: 2024-04-01 | End: 2024-04-01

## 2024-04-01 RX ORDER — SODIUM CHLORIDE 9 MG/ML
INJECTION, SOLUTION INTRAVENOUS CONTINUOUS PRN
Status: DISCONTINUED | OUTPATIENT
Start: 2024-04-01 | End: 2024-04-01

## 2024-04-01 RX ORDER — SODIUM CHLORIDE 0.9 % (FLUSH) 0.9 %
10 SYRINGE (ML) INJECTION
Status: DISCONTINUED | OUTPATIENT
Start: 2024-04-01 | End: 2024-04-02 | Stop reason: HOSPADM

## 2024-04-01 RX ORDER — PROPOFOL 10 MG/ML
VIAL (ML) INTRAVENOUS
Status: DISCONTINUED | OUTPATIENT
Start: 2024-04-01 | End: 2024-04-01

## 2024-04-01 RX ORDER — PANTOPRAZOLE SODIUM 40 MG/1
TABLET, DELAYED RELEASE ORAL
Qty: 180 TABLET | Refills: 3 | Status: SHIPPED | OUTPATIENT
Start: 2024-04-01 | End: 2025-04-01

## 2024-04-01 RX ADMIN — PROPOFOL 50 MG: 10 INJECTION, EMULSION INTRAVENOUS at 01:04

## 2024-04-01 RX ADMIN — PROPOFOL 120 MG: 10 INJECTION, EMULSION INTRAVENOUS at 01:04

## 2024-04-01 RX ADMIN — LIDOCAINE HYDROCHLORIDE 60 MG: 20 INJECTION, SOLUTION INTRAVENOUS at 01:04

## 2024-04-01 RX ADMIN — SODIUM CHLORIDE: 9 INJECTION, SOLUTION INTRAVENOUS at 01:04

## 2024-04-01 NOTE — H&P
Gastroenterology Pre-procedure H&P    History of Present Illness    Madeleine Koch is a 69 y.o. female that  has a past medical history of Abnormal vaginal bleeding with endometrial thickness greater than 5 mm present on transvaginal ultrasound in postmenopausal patient (10/01/2020), Atrophic vaginitis (10/11/2016), Depression, Dysuria, Essential hypertension, Hyperlipemia, UTI (urinary tract infection), and Yeast vaginitis (10/11/2016).     Patient with GERD and intermittent dysphagia to solids here for EGD with dilation.       Past Medical History:   Diagnosis Date    Abnormal vaginal bleeding with endometrial thickness greater than 5 mm present on transvaginal ultrasound in postmenopausal patient 10/01/2020    Atrophic vaginitis 10/11/2016    Depression     Dysuria     Essential hypertension     Hyperlipemia     UTI (urinary tract infection)     Yeast vaginitis 10/11/2016       Past Surgical History:   Procedure Laterality Date    BACK SURGERY  1988    Carbon dioxide vaginal rejuvenation laser within the vagina N/A 05/11/2021    Procedure of 1/3 steps performed today at [facility] for severe atrophic vaginitis/dyspareunia    COLONOSCOPY      DIAGNOSTIC LAPAROSCOPY N/A 04/20/2021    Procedure: LAPAROSCOPY, DIAGNOSTIC;  Surgeon: Dedrick Dunbar MD;  Location: [facility] OR;  Service: OB/GYN;  Laterality: N/A;    EXCISION OF BREAST LESION      EYE SURGERY      5 yrs ago; cataracts    HYSTEROSCOPY WITH DILATION AND CURETTAGE OF UTERUS N/A 04/20/2021    Procedure: HYSTEROSCOPY, WITH DILATION AND CURETTAGE OF UTERUS;  Surgeon: Dedrick Dunbar MD;  Location: [facility] OR;  Service: OB/GYN;  Laterality: N/A;    Laser rejuvenation carbon dioxide N/A Completion date 07/13/2021    Laser carbon dioxide rejuvenation performed for vaginal contracture atrophic vaginitis-90% improved    SPINE SURGERY  1988       Family History   Problem Relation Age of Onset    Stroke Paternal Grandfather     Hepatitis Maternal  Grandmother     Heart disease Father     Hypertension Father     Hypertension Mother        Social History     Socioeconomic History    Marital status:    Occupational History    Occupation: retired   Tobacco Use    Smoking status: Former     Current packs/day: 0.00     Average packs/day: 1 pack/day for 30.0 years (30.0 ttl pk-yrs)     Types: Cigarettes     Start date: 1975     Quit date: 2005     Years since quittin.5     Passive exposure: Past    Smokeless tobacco: Never    Tobacco comments:     Stopped smoking    Substance and Sexual Activity    Alcohol use: Not Currently    Drug use: Never    Sexual activity: Yes     Partners: Male     Birth control/protection: None       Current Outpatient Medications   Medication Sig Dispense Refill    citalopram (CELEXA) 20 MG tablet Take 1 tablet (20 mg total) by mouth once daily. 90 tablet 3    cyclobenzaprine (FLEXERIL) 10 MG tablet Take 1 tablet (10 mg total) by mouth 3 (three) times daily as needed for Muscle spasms. 270 tablet 3    gabapentin (NEURONTIN) 100 MG capsule Take 1 capsule (100 mg total) by mouth 3 (three) times daily. 90 capsule 0    latanoprost 0.005 % ophthalmic solution Place 1 drop into both eyes every evening.      lisinopriL (PRINIVIL,ZESTRIL) 20 MG tablet TAKE 1 TABLET DAILY 90 tablet 3    meloxicam (MOBIC) 15 MG tablet TAKE 1 TABLET DAILY AS NEEDED FOR PAIN 90 tablet 3    naproxen (NAPROSYN) 500 MG tablet Take 1 tablet (500 mg total) by mouth 2 (two) times daily with meals. 60 tablet 0    rosuvastatin (CRESTOR) 10 MG tablet TAKE 1 TABLET DAILY 90 tablet 3    tolterodine (DETROL LA) 4 MG 24 hr capsule Take 1 capsule (4 mg total) by mouth once daily. 90 capsule 3     No current facility-administered medications for this encounter.     Facility-Administered Medications Ordered in Other Encounters   Medication Dose Route Frequency Provider Last Rate Last Admin    0.9%  NaCl infusion   Intravenous Continuous PRN Jeff Richardson,  "CRNA 125 mL/hr at 04/01/24 1307 New Bag at 04/01/24 1307       Review of patient's allergies indicates:  No Known Allergies    Objective:  Vitals:    04/01/24 1238   BP: (!) 140/85   Pulse: 79   Resp: 12   SpO2: 95%   Weight: 56.2 kg (124 lb)   Height: 5' 1" (1.549 m)        GEN: normal appearing, NAD, AAO x3  HENT: NCAT, anicteric, OP benign  CV: normal rate, regular rhythm  RESP: CTA, symmetric rise, unlabored  ABD: soft, ND, no guarding or TTP  SKIN: warm and dry  NEURO: grossly afocal    Assessment and Plan:    Proceed with:    EGD for dysphagia       Bob Aaron MD  Gastroenterology  "

## 2024-04-01 NOTE — DISCHARGE INSTRUCTIONS
Procedure Date  4/1/24     Impression  Overall Impression:   Grade A esophagitis in the GE junction  Peptic intrinsic stricture with length of less than 1 cm and diameter of 13 mm in the GE junction; performed cold forceps biopsy; dilated to 48 Fr ending size. Dilation caused mucosal tears and disruption of ring; post-dilation mucosal tears were superficial  3 cm type I hiatal hernia  Moderate abnormal mucosa in the antrum and duodenal bulb, consistent with gastritis; performed cold forceps biopsies to rule out H. pylori  The upper third of the esophagus, middle third of the esophagus, lower third of the esophagus, cardia, body of the stomach, incisura, 1st part of the duodenum and 2nd part of the duodenum appeared normal.        Recommendation    Await pathology results  Avoid NSAIDs  Start protonix twice daily for one month then daily  Repeat dilation in the future as needed     Follow up in GI clinic as needed       Outcome of procedure: successful EGD  Disposition: patient to recovery following procedure; discharge to home when appropriate parameters met  Provisions for follow up: please call my office for any unexpected symptoms like chest or abdominal pain or bleeding following your procedure.  Final Diagnosis: esophagitis      NO driving today, no operating heavy machinery, no signing any legal documents until tomorrow.     Drink lots of fluids, resume regular diet.  Take your normal medications.     Please call our office for any nausea, vomiting, or abdominal pain.     THE NURSE WILL CALL YOU WITH YOUR BIOPSY RESULTS IN A FEW DAYS. IF YOU HAVE  [facility] MYCHART YOUR RESULTS WILL APPEAR THERE AS WELL.

## 2024-04-01 NOTE — ANESTHESIA PREPROCEDURE EVALUATION
04/01/2024  Madeleine Koch is a 69 y.o., female.  Past Medical History:   Diagnosis Date    Abnormal vaginal bleeding with endometrial thickness greater than 5 mm present on transvaginal ultrasound in postmenopausal patient 10/01/2020    Atrophic vaginitis 10/11/2016    Depression     Dysuria     Essential hypertension     Hyperlipemia     UTI (urinary tract infection)     Yeast vaginitis 10/11/2016       Past Surgical History:   Procedure Laterality Date    BACK SURGERY  1988    Carbon dioxide vaginal rejuvenation laser within the vagina N/A 05/11/2021    Procedure of 1/3 steps performed today at [facility] for severe atrophic vaginitis/dyspareunia    COLONOSCOPY      DIAGNOSTIC LAPAROSCOPY N/A 04/20/2021    Procedure: LAPAROSCOPY, DIAGNOSTIC;  Surgeon: Dedrick Dunbar MD;  Location: [facility];  Service: OB/GYN;  Laterality: N/A;    EXCISION OF BREAST LESION      EYE SURGERY      5 yrs ago    HYSTEROSCOPY WITH DILATION AND CURETTAGE OF UTERUS N/A 04/20/2021    Procedure: HYSTEROSCOPY, WITH DILATION AND CURETTAGE OF UTERUS;  Surgeon: Dedrick Dunbar MD;  Location: [facility];  Service: OB/GYN;  Laterality: N/A;    Laser rejuvenation carbon dioxide N/A Completion date 07/13/2021    Laser carbon dioxide rejuvenation performed for vaginal contracture atrophic vaginitis-90% improved    SPINE SURGERY  1988       Family History   Problem Relation Age of Onset    Stroke Paternal Grandfather     Hepatitis Maternal Grandmother     Heart disease Father     Hypertension Father     Hypertension Mother        Social History     Socioeconomic History    Marital status:    Occupational History    Occupation: retired   Tobacco Use    Smoking status: Former     Current packs/day: 0.00     Average packs/day: 1 pack/day for 30.0 years (30.0 ttl pk-yrs)     Types: Cigarettes     Start date: 9/1/1975      Quit date: 2005     Years since quittin.5     Passive exposure: Past    Smokeless tobacco: Never    Tobacco comments:     Stopped smoking    Substance and Sexual Activity    Alcohol use: Not Currently    Drug use: Never    Sexual activity: Yes     Partners: Male     Birth control/protection: None       Current Outpatient Medications   Medication Sig Dispense Refill    citalopram (CELEXA) 20 MG tablet Take 1 tablet (20 mg total) by mouth once daily. 90 tablet 3    cyclobenzaprine (FLEXERIL) 10 MG tablet Take 1 tablet (10 mg total) by mouth 3 (three) times daily as needed for Muscle spasms. 270 tablet 3    gabapentin (NEURONTIN) 100 MG capsule Take 1 capsule (100 mg total) by mouth 3 (three) times daily. 90 capsule 0    latanoprost 0.005 % ophthalmic solution Place 1 drop into both eyes every evening.      lisinopriL (PRINIVIL,ZESTRIL) 20 MG tablet TAKE 1 TABLET DAILY 90 tablet 3    meloxicam (MOBIC) 15 MG tablet TAKE 1 TABLET DAILY AS NEEDED FOR PAIN 90 tablet 3    naproxen (NAPROSYN) 500 MG tablet Take 1 tablet (500 mg total) by mouth 2 (two) times daily with meals. 60 tablet 0    rosuvastatin (CRESTOR) 10 MG tablet TAKE 1 TABLET DAILY 90 tablet 3    tolterodine (DETROL LA) 4 MG 24 hr capsule Take 1 capsule (4 mg total) by mouth once daily. 90 capsule 3     No current facility-administered medications for this encounter.       Review of patient's allergies indicates:  No Known Allergies  Patient Active Problem List   Diagnosis    Right shoulder pain    TMJ (temporomandibular joint disorder)    Primary hypertension    Pelvic pain in female    Vaginitis, atrophic    OAB (overactive bladder)    Dyspareunia, female    Weakness of right arm    Low TSH level    Vitamin D deficiency    Pure hypercholesterolemia    Anxiety    Colon cancer screening         Pre-op Assessment    I have reviewed the Patient Summary Reports.     I have reviewed the Nursing Notes. I have reviewed the NPO Status.   I have reviewed  the Medications.     Review of Systems  Social:  Former Smoker       Cardiovascular:     Hypertension                                  Hypertension         Psych:  Psychiatric History                  Physical Exam  General: Well nourished, Alert, Oriented and Cooperative    Airway:  Mallampati: III   Mouth Opening: Normal  Neck ROM: Normal ROM    Dental:  Intact    Chest/Lungs:  Normal Respiratory Rate    Heart:  Rate: Normal        Anesthesia Plan  Type of Anesthesia, risks & benefits discussed:    Anesthesia Type: Gen Natural Airway, MAC  Intra-op Monitoring Plan: Standard ASA Monitors  Post Op Pain Control Plan: multimodal analgesia and IV/PO Opioids PRN  Induction:  IV  Informed Consent: Informed consent signed with the Patient and all parties understand the risks and agree with anesthesia plan.  All questions answered. Patient consented to blood products? Yes  ASA Score: 3  Day of Surgery Review of History & Physical: I have interviewed and examined the patient. I have reviewed the patient's H&P dated: There are no significant changes.     Ready For Surgery From Anesthesia Perspective.     .

## 2024-04-01 NOTE — TRANSFER OF CARE
"Anesthesia Transfer of Care Note    Patient: Madeleine Koch    Procedure(s) Performed: EGD    Patient location: GI    Anesthesia Type: general and MAC    Transport from OR: Transported from OR on room air with adequate spontaneous ventilation    Post pain: adequate analgesia    Post assessment: no apparent anesthetic complications    Post vital signs: stable    Level of consciousness: awake, alert and oriented    Nausea/Vomiting: no nausea/vomiting    Complications: none    Transfer of care protocol was followedComments: Refer to nursing note for pain torres score upon discharge from recovery      Last vitals: Visit Vitals  BP (!) 94/55   Pulse 97   Temp 36.4 °C (97.6 °F)   Resp 12   Ht 5' 1" (1.549 m)   Wt 56.2 kg (124 lb)   SpO2 99%   Breastfeeding No   BMI 23.43 kg/m²     "

## 2024-04-01 NOTE — ANESTHESIA POSTPROCEDURE EVALUATION
Anesthesia Post Evaluation    Patient: Madeleine Koch    Procedure(s) Performed: EGD    Final Anesthesia Type: general      Patient location during evaluation: GI PACU  Patient participation: Yes- Able to Participate  Level of consciousness: awake and alert  Post-procedure vital signs: reviewed and stable  Pain management: adequate  Airway patency: patent    PONV status at discharge: No PONV  Anesthetic complications: no      Cardiovascular status: blood pressure returned to baseline  Respiratory status: unassisted and spontaneous ventilation  Hydration status: euvolemic  Follow-up not needed.  Comments: Refer to nursing note for pain and torres score upon discharge from recovery              Vitals Value Taken Time   /76 04/01/24 1432   Temp 98f 04/01/24 1437   Pulse 69 04/01/24 1432   Resp 10 04/01/24 1432   SpO2 98 % 04/01/24 1432         Event Time   Out of Recovery 14:35:52         Pain/Torres Score: Torres Score: 10 (4/1/2024  2:10 PM)

## 2024-04-15 ENCOUNTER — OFFICE VISIT (OUTPATIENT)
Dept: ORTHOPEDICS | Facility: CLINIC | Age: 69
End: 2024-04-15
Payer: MEDICARE

## 2024-04-15 DIAGNOSIS — M25.511 RIGHT SHOULDER PAIN, UNSPECIFIED CHRONICITY: Primary | ICD-10-CM

## 2024-04-15 PROCEDURE — 99213 OFFICE O/P EST LOW 20 MIN: CPT | Mod: PBBFAC | Performed by: NURSE PRACTITIONER

## 2024-04-15 PROCEDURE — 20610 DRAIN/INJ JOINT/BURSA W/O US: CPT | Mod: PBBFAC,RT | Performed by: NURSE PRACTITIONER

## 2024-04-15 PROCEDURE — 99999PBSHW PR PBB SHADOW TECHNICAL ONLY FILED TO HB: Mod: PBBFAC,,,

## 2024-04-15 PROCEDURE — 99212 OFFICE O/P EST SF 10 MIN: CPT | Mod: S$PBB,25,, | Performed by: NURSE PRACTITIONER

## 2024-04-15 RX ORDER — TRIAMCINOLONE ACETONIDE 40 MG/ML
80 INJECTION, SUSPENSION INTRA-ARTICULAR; INTRAMUSCULAR
Status: DISCONTINUED | OUTPATIENT
Start: 2024-04-15 | End: 2024-04-15 | Stop reason: HOSPADM

## 2024-04-15 RX ADMIN — TRIAMCINOLONE ACETONIDE 80 MG: 40 INJECTION, SUSPENSION INTRA-ARTICULAR; INTRAMUSCULAR at 02:04

## 2024-04-15 NOTE — PROGRESS NOTES
Madeleine Koch returns to clinic for a follow up visit regarding     ICD-10-CM ICD-9-CM   1. Right shoulder pain, unspecified chronicity  M25.511 719.41       Patient reports she has been using her shoulder more than normal as she has been cleaning out her mom's apartment.   She has painful, limited ROM.   She reports she has been having these symptoms for around a month to month and a half now.   She has not had an injection in her shoulder but wishes to discuss getting an injection today.       No specific injury.       PAST MEDICAL HISTORY:   Past Medical History:   Diagnosis Date    Abnormal vaginal bleeding with endometrial thickness greater than 5 mm present on transvaginal ultrasound in postmenopausal patient 10/01/2020    Atrophic vaginitis 10/11/2016    Depression     Dysuria     Essential hypertension     Hyperlipemia     UTI (urinary tract infection)     Yeast vaginitis 10/11/2016     PAST SURGICAL HISTORY:   Past Surgical History:   Procedure Laterality Date    BACK SURGERY  1988    Carbon dioxide vaginal rejuvenation laser within the vagina N/A 05/11/2021    Procedure of 1/3 steps performed today at [facility] for severe atrophic vaginitis/dyspareunia    COLONOSCOPY      DIAGNOSTIC LAPAROSCOPY N/A 04/20/2021    Procedure: LAPAROSCOPY, DIAGNOSTIC;  Surgeon: Dedrick Dunbar MD;  Location: [facility] OR;  Service: OB/GYN;  Laterality: N/A;    EXCISION OF BREAST LESION      EYE SURGERY      5 yrs ago; cataracts    HYSTEROSCOPY WITH DILATION AND CURETTAGE OF UTERUS N/A 04/20/2021    Procedure: HYSTEROSCOPY, WITH DILATION AND CURETTAGE OF UTERUS;  Surgeon: Dedrick Dunbar MD;  Location: [facility] OR;  Service: OB/GYN;  Laterality: N/A;    Laser rejuvenation carbon dioxide N/A Completion date 07/13/2021    Laser carbon dioxide rejuvenation performed for vaginal contracture atrophic vaginitis-90% improved    SPINE SURGERY  1988         PHYSICAL EXAMINATION:  General    Nursing note and vitals  reviewed.  Constitutional: She is oriented to person, place, and time. She appears well-developed and well-nourished. No distress.   HENT:   Head: Normocephalic and atraumatic.   Eyes: Pupils are equal, round, and reactive to light.   Neck: Neck supple.   Cardiovascular:  Normal rate and intact distal pulses.            Pulmonary/Chest: Breath sounds normal. No respiratory distress. She exhibits no tenderness.   Abdominal: Soft. Bowel sounds are normal.   Neurological: She is alert and oriented to person, place, and time. She has normal reflexes. She displays normal reflexes. No cranial nerve deficit. She exhibits normal muscle tone.   Psychiatric: She has a normal mood and affect. Her behavior is normal. Judgment and thought content normal.         Right Shoulder Exam     Inspection/Observation   Swelling: absent  Bruising: absent  Scapular Dyskinesia: positive    Tenderness   The patient is tender to palpation of the greater tuberosity, acromion and acromioclavicular joint.    Range of Motion   Active abduction:  abnormal   Passive abduction:  abnormal   Forward Flexion:  abnormal   Forward Elevation: abnormal  External Rotation 90 degrees: normal    Tests & Signs   Apprehension: negative  Cross arm: positive  Drop arm: negative  Hanley test: positive  Impingement: positive  Rotator Cuff Painful Arc/Range: mild  Lag Sign 90 degrees: negative  Lift Off Sign: positive  Belly Press: positive  Active Compression Test ([place]'s Sign): positive  Jerk Test: negative    Other   Sensation: normal    Comments:  Pain with dynamic labral shear test.  There is pain and weakness with Whipple testing.     Left Shoulder Exam   Left shoulder exam is normal.       Muscle Strength   Right Upper Extremity   Shoulder External Rotation: 4/5   Supraspinatus: 4/5   Subscapularis: 4/5   Biceps: 4/5     Reflexes     Right Side   Biceps:  2+  Right Camilo's Sign:  absent    Vascular Exam     Right Pulses      Radial:                     2+      Capillary Refill  Right Hand: normal capillary refill        IMAGING:      ASSESSMENT:      ICD-10-CM ICD-9-CM   1. Right shoulder pain, unspecified chronicity  M25.511 719.41       PLAN:     -Findings and treatment options were discussed with the patient  -All questions answered  Natural history and expected course discussed. Questions answered.  Educational material distributed.  Reduction in offending activity.  OTC analgesics as needed.    Right shoulder subacromial injection today.  We will put her arm her on a home exercise program.  Return to clinic 6 weeks for recheck.  We will consider MRI if no better.  There are no Patient Instructions on file for this visit.    No orders of the defined types were placed in this encounter.      Large Joint Aspiration/Injection: R subacromial bursa    Date/Time: 4/15/2024 2:20 PM    Performed by: Alissa Le FNP  Authorized by: Alissa Le FNP    Consent Done?:  Yes (Verbal)  Indications:  Pain  Site marked: the procedure site was marked    Local anesthetic:  Bupivacaine 0.25% without epinephrine (4 cc's of 0.25% bupivicaine)    Details:  Needle Size:  22 G  Approach:  Posterior  Location:  Shoulder  Site:  R subacromial bursa  Medications:  80 mg triamcinolone acetonide 40 mg/mL  Patient tolerance:  Patient tolerated the procedure well with no immediate complications

## 2024-06-09 DIAGNOSIS — Z71.89 COMPLEX CARE COORDINATION: ICD-10-CM

## 2024-06-20 ENCOUNTER — OFFICE VISIT (OUTPATIENT)
Dept: FAMILY MEDICINE | Facility: CLINIC | Age: 69
End: 2024-06-20
Payer: MEDICARE

## 2024-06-20 VITALS
HEIGHT: 61 IN | DIASTOLIC BLOOD PRESSURE: 89 MMHG | HEART RATE: 86 BPM | WEIGHT: 132 LBS | TEMPERATURE: 99 F | BODY MASS INDEX: 24.92 KG/M2 | SYSTOLIC BLOOD PRESSURE: 128 MMHG | RESPIRATION RATE: 16 BRPM | OXYGEN SATURATION: 97 %

## 2024-06-20 DIAGNOSIS — I10 PRIMARY HYPERTENSION: Chronic | ICD-10-CM

## 2024-06-20 DIAGNOSIS — E78.00 PURE HYPERCHOLESTEROLEMIA: Chronic | ICD-10-CM

## 2024-06-20 DIAGNOSIS — T46.6X5A MYALGIA DUE TO STATIN: Primary | ICD-10-CM

## 2024-06-20 DIAGNOSIS — M79.10 MYALGIA DUE TO STATIN: Primary | ICD-10-CM

## 2024-06-20 PROCEDURE — 99214 OFFICE O/P EST MOD 30 MIN: CPT | Mod: ,,, | Performed by: FAMILY MEDICINE

## 2024-06-20 RX ORDER — FAMOTIDINE 20 MG/1
20 TABLET, FILM COATED ORAL 2 TIMES DAILY
COMMUNITY

## 2024-06-20 RX ORDER — ROSUVASTATIN CALCIUM 10 MG/1
TABLET, COATED ORAL
Qty: 90 TABLET | Refills: 3 | Status: SHIPPED | OUTPATIENT
Start: 2024-06-20

## 2024-06-20 RX ORDER — LISINOPRIL 20 MG/1
20 TABLET ORAL DAILY
Qty: 90 TABLET | Refills: 3 | Status: SHIPPED | OUTPATIENT
Start: 2024-06-20

## 2024-06-21 NOTE — PROGRESS NOTES
"Subjective     Patient ID: Madeleine Koch is a 69 y.o. female.    Chief Complaint: Insect Bite (tick) and Medication Management (crestor)    68 yo WF here with complaints that she realized that her crestor 10 mg was causing muscle pain. Saw cardiology and all was ok. Symptoms started after she went from 5 mg to 10 mg dose. Also more recently, had a tick bite on her abdomen that cause a lot of redness on her abdomen immediately after removal.Feels the tick was attached < 24 hours.     Insect Bite  Pertinent negatives include no arthralgias, chest pain, headaches, joint swelling, neck pain, vomiting or weakness.     Review of Systems   Constitutional:  Negative for activity change and unexpected weight change.   HENT:  Negative for hearing loss, rhinorrhea and trouble swallowing.    Eyes:  Negative for discharge and visual disturbance.   Respiratory:  Negative for chest tightness and wheezing.    Cardiovascular:  Negative for chest pain and palpitations.   Gastrointestinal:  Negative for blood in stool, constipation, diarrhea and vomiting.   Endocrine: Negative for polydipsia and polyuria.   Genitourinary:  Negative for difficulty urinating, dysuria, hematuria and menstrual problem.   Musculoskeletal:  Negative for arthralgias, joint swelling and neck pain.   Neurological:  Negative for weakness and headaches.   Psychiatric/Behavioral:  Negative for confusion and dysphoric mood.           Objective   Blood pressure 128/89, pulse 86, temperature 98.6 °F (37 °C), temperature source Oral, resp. rate 16, height 5' 1" (1.549 m), weight 59.9 kg (132 lb), SpO2 97%.    Physical Exam  Constitutional:       Appearance: Normal appearance.   HENT:      Head: Normocephalic and atraumatic.      Nose: Nose normal.      Mouth/Throat:      Mouth: Mucous membranes are moist.   Eyes:      Pupils: Pupils are equal, round, and reactive to light.   Cardiovascular:      Rate and Rhythm: Normal rate.      Pulses: Normal pulses. "   Pulmonary:      Effort: Pulmonary effort is normal.   Abdominal:      General: Abdomen is flat.      Palpations: Abdomen is soft.   Skin:     General: Skin is warm and dry.   Neurological:      General: No focal deficit present.      Mental Status: She is alert and oriented to person, place, and time.   Psychiatric:         Mood and Affect: Mood normal.         Behavior: Behavior normal.         Thought Content: Thought content normal.         Judgment: Judgment normal.            Assessment and Plan     1. Myalgia due to statin    2. Primary hypertension  -     lisinopriL (PRINIVIL,ZESTRIL) 20 MG tablet; Take 1 tablet (20 mg total) by mouth once daily.  Dispense: 90 tablet; Refill: 3    3. Pure hypercholesterolemia        Will cut her crestor in half until her next visit and see if that takes care of it.          Follow up if symptoms worsen or fail to improve.

## 2024-08-26 DIAGNOSIS — I10 PRIMARY HYPERTENSION: Chronic | ICD-10-CM

## 2024-08-26 RX ORDER — LISINOPRIL 20 MG/1
20 TABLET ORAL
Qty: 90 TABLET | Refills: 3 | Status: SHIPPED | OUTPATIENT
Start: 2024-08-26

## 2024-12-05 ENCOUNTER — OFFICE VISIT (OUTPATIENT)
Dept: FAMILY MEDICINE | Facility: CLINIC | Age: 69
End: 2024-12-05
Payer: MEDICARE

## 2024-12-05 VITALS
HEIGHT: 61 IN | RESPIRATION RATE: 20 BRPM | DIASTOLIC BLOOD PRESSURE: 88 MMHG | OXYGEN SATURATION: 95 % | WEIGHT: 136.5 LBS | TEMPERATURE: 98 F | BODY MASS INDEX: 25.77 KG/M2 | HEART RATE: 93 BPM | SYSTOLIC BLOOD PRESSURE: 130 MMHG

## 2024-12-05 DIAGNOSIS — Z23 NEED FOR IMMUNIZATION AGAINST INFLUENZA: ICD-10-CM

## 2024-12-05 DIAGNOSIS — R73.9 HYPERGLYCEMIA: ICD-10-CM

## 2024-12-05 DIAGNOSIS — E55.9 VITAMIN D DEFICIENCY: Chronic | ICD-10-CM

## 2024-12-05 DIAGNOSIS — I10 PRIMARY HYPERTENSION: Primary | Chronic | ICD-10-CM

## 2024-12-05 DIAGNOSIS — E78.00 PURE HYPERCHOLESTEROLEMIA: Chronic | ICD-10-CM

## 2024-12-05 LAB
25(OH)D3 SERPL-MCNC: 42.2 NG/ML (ref 30–80)
ALBUMIN SERPL BCP-MCNC: 3.8 G/DL (ref 3.4–4.8)
ALBUMIN/GLOB SERPL: 1.3 {RATIO}
ALP SERPL-CCNC: 85 U/L (ref 40–150)
ALT SERPL W P-5'-P-CCNC: 33 U/L
ANION GAP SERPL CALCULATED.3IONS-SCNC: 11 MMOL/L (ref 7–16)
AST SERPL W P-5'-P-CCNC: 41 U/L (ref 5–34)
BASOPHILS # BLD AUTO: 0.04 K/UL (ref 0–0.2)
BASOPHILS NFR BLD AUTO: 0.6 % (ref 0–1)
BILIRUB SERPL-MCNC: 1.1 MG/DL
BUN SERPL-MCNC: 18 MG/DL (ref 10–20)
BUN/CREAT SERPL: 18 (ref 6–20)
CALCIUM SERPL-MCNC: 9 MG/DL (ref 8.4–10.2)
CHLORIDE SERPL-SCNC: 108 MMOL/L (ref 98–107)
CHOLEST SERPL-MCNC: 207 MG/DL
CHOLEST/HDLC SERPL: 4.2 {RATIO}
CO2 SERPL-SCNC: 24 MMOL/L (ref 23–31)
CREAT SERPL-MCNC: 0.98 MG/DL (ref 0.55–1.02)
DIFFERENTIAL METHOD BLD: ABNORMAL
EGFR (NO RACE VARIABLE) (RUSH/TITUS): 63 ML/MIN/1.73M2
EOSINOPHIL # BLD AUTO: 0.34 K/UL (ref 0–0.5)
EOSINOPHIL NFR BLD AUTO: 4.8 % (ref 1–4)
ERYTHROCYTE [DISTWIDTH] IN BLOOD BY AUTOMATED COUNT: 12.4 % (ref 11.5–14.5)
GLOBULIN SER-MCNC: 3 G/DL (ref 2–4)
GLUCOSE SERPL-MCNC: 116 MG/DL (ref 82–115)
HCT VFR BLD AUTO: 41.6 % (ref 38–47)
HDLC SERPL-MCNC: 49 MG/DL (ref 35–60)
HGB BLD-MCNC: 13.7 G/DL (ref 12–16)
IMM GRANULOCYTES # BLD AUTO: 0.02 K/UL (ref 0–0.04)
IMM GRANULOCYTES NFR BLD: 0.3 % (ref 0–0.4)
LDLC SERPL CALC-MCNC: 126 MG/DL
LDLC/HDLC SERPL: 2.6 {RATIO}
LYMPHOCYTES # BLD AUTO: 1.62 K/UL (ref 1–4.8)
LYMPHOCYTES NFR BLD AUTO: 22.9 % (ref 27–41)
MCH RBC QN AUTO: 29.4 PG (ref 27–31)
MCHC RBC AUTO-ENTMCNC: 32.9 G/DL (ref 32–36)
MCV RBC AUTO: 89.3 FL (ref 80–96)
MONOCYTES # BLD AUTO: 0.75 K/UL (ref 0–0.8)
MONOCYTES NFR BLD AUTO: 10.6 % (ref 2–6)
MPC BLD CALC-MCNC: 10.7 FL (ref 9.4–12.4)
NEUTROPHILS # BLD AUTO: 4.31 K/UL (ref 1.8–7.7)
NEUTROPHILS NFR BLD AUTO: 60.8 % (ref 53–65)
NONHDLC SERPL-MCNC: 158 MG/DL
NRBC # BLD AUTO: 0 X10E3/UL
NRBC, AUTO (.00): 0 %
PLATELET # BLD AUTO: 231 K/UL (ref 150–400)
POTASSIUM SERPL-SCNC: 3.6 MMOL/L (ref 3.5–5.1)
PROT SERPL-MCNC: 6.8 G/DL (ref 5.8–7.6)
RBC # BLD AUTO: 4.66 M/UL (ref 4.2–5.4)
SODIUM SERPL-SCNC: 139 MMOL/L (ref 136–145)
TRIGL SERPL-MCNC: 158 MG/DL (ref 37–140)
TSH SERPL DL<=0.005 MIU/L-ACNC: 0.49 UIU/ML (ref 0.35–4.94)
VLDLC SERPL-MCNC: 32 MG/DL
WBC # BLD AUTO: 7.08 K/UL (ref 4.5–11)

## 2024-12-05 PROCEDURE — 90653 IIV ADJUVANT VACCINE IM: CPT | Mod: ,,, | Performed by: FAMILY MEDICINE

## 2024-12-05 PROCEDURE — 99214 OFFICE O/P EST MOD 30 MIN: CPT | Mod: 25,,, | Performed by: FAMILY MEDICINE

## 2024-12-05 PROCEDURE — 84443 ASSAY THYROID STIM HORMONE: CPT | Mod: ,,, | Performed by: CLINICAL MEDICAL LABORATORY

## 2024-12-05 PROCEDURE — G0008 ADMIN INFLUENZA VIRUS VAC: HCPCS | Mod: ,,, | Performed by: FAMILY MEDICINE

## 2024-12-05 PROCEDURE — 80061 LIPID PANEL: CPT | Mod: ,,, | Performed by: CLINICAL MEDICAL LABORATORY

## 2024-12-05 PROCEDURE — 85025 COMPLETE CBC W/AUTO DIFF WBC: CPT | Mod: ,,, | Performed by: CLINICAL MEDICAL LABORATORY

## 2024-12-05 PROCEDURE — 36415 COLL VENOUS BLD VENIPUNCTURE: CPT | Mod: ,,, | Performed by: FAMILY MEDICINE

## 2024-12-05 PROCEDURE — 80053 COMPREHEN METABOLIC PANEL: CPT | Mod: ,,, | Performed by: CLINICAL MEDICAL LABORATORY

## 2024-12-05 PROCEDURE — 82306 VITAMIN D 25 HYDROXY: CPT | Mod: ,,, | Performed by: CLINICAL MEDICAL LABORATORY

## 2024-12-05 RX ORDER — CITALOPRAM 10 MG/1
10 TABLET ORAL DAILY
Qty: 90 TABLET | Refills: 3 | Status: SHIPPED | OUTPATIENT
Start: 2024-12-05 | End: 2025-12-05

## 2024-12-06 ENCOUNTER — PATIENT MESSAGE (OUTPATIENT)
Dept: FAMILY MEDICINE | Facility: CLINIC | Age: 69
End: 2024-12-06
Payer: MEDICARE

## 2024-12-09 DIAGNOSIS — E78.00 PURE HYPERCHOLESTEROLEMIA: Primary | ICD-10-CM

## 2024-12-09 RX ORDER — EZETIMIBE 10 MG/1
10 TABLET ORAL DAILY
Qty: 90 TABLET | Refills: 3 | Status: SHIPPED | OUTPATIENT
Start: 2024-12-09 | End: 2025-12-09

## 2024-12-20 NOTE — PROGRESS NOTES
"Subjective     Patient ID: Madeleine Koch is a 69 y.o. female.    Chief Complaint: Annual Exam    History of Present Illness    CHIEF COMPLAINT:  Patient presents today for follow-up.    MEDICATIONS:  She reports her Celexa prescription is about to . She is currently taking 10mg (half of a 20mg dose). She is taking Crestor (rosuvastatin) 5mg (half of a 10mg pill) and reports feeling much better without pain on this regimen. She takes Vi-Active vitamin D supplement in a chocolate-like form, as she has difficulty swallowing large pills.    CHOLESTEROL MANAGEMENT:  She reports feeling significantly better on the current dose of Crestor 5mg. Previous cholesterol levels showed total cholesterol at 157 mg/dL, with LDL levels ranging from 75 to 81 mg/dL. She denies experiencing cognitive side effects or fatigue from the cholesterol medication.    MUSCULOSKELETAL:  She reports a persistent, painful cyst on her body. She uses compression for relief, which she finds helpful in reducing discomfort and limiting movement of the affected area.    COGNITIVE CONCERNS:  She expresses concerns about cognitive function, noting frequent forgetfulness and word-finding difficulties, particularly when trying to communicate with others. She does not feel "all there all the time" and is worried about her daily functioning. She reports a family history of cognitive decline, mentioning her grandfather had Alzheimer's disease and her mother has been experiencing similar cognitive issues for years.    SOCIAL HISTORY:  She reports a history of smoking for approximately 30 years, quitting in . She expresses concerns about potential lung issues related to her smoking history and inquires about the need for screening.    BONE HEALTH:  She reports difficulty taking calcium supplements due to their size and form, expressing a strong aversion to calcium pills.    LAB RESULTS:  She expresses concerns about abnormal results from previous " labs, specifically noting elevated monocytes on her blood count. Blood tests have consistently shown high monocyte levels for years.      ROS:  Constitutional: -fatigue  Musculoskeletal: -joint pain  Psychiatric: +memory problems         Office Visit on 12/05/2024   Component Date Value Ref Range Status    Triglycerides 12/05/2024 158 (H)  37 - 140 mg/dL Final      Normal:  <150 mg/dL  Borderline High: 150-199 mg/dL  High:   200-499 mg/dL  Very High:  >=500    Cholesterol 12/05/2024 207 (H)  <=200 mg/dL Final      <200 mg/dL:  Desirable  200-240 mg/dL: Borderline High  >240 mg/dL:  High    HDL Cholesterol 12/05/2024 49  35 - 60 mg/dL Final      <40 mg/dL: Low HDL  40-60 mg/dL: Normal  >60 mg/dL: Desirable    Cholesterol/HDL Ratio (Risk Factor) 12/05/2024 4.2   Final    Non-HDL 12/05/2024 158  mg/dL Final    LDL Calculated 12/05/2024 126  mg/dL Final    Unable to calculate due to one of the following values:  Cholesterol <5  HDL Cholesterol <5  Triglycerides <10 or >400    LDL/HDL 12/05/2024 2.6   Final    Unable to calculate due to one of the following values:  Cholesterol <5  HDL Cholesterol <5  Triglycerides <10 or >400    VLDL 12/05/2024 32  mg/dL Final    Sodium 12/05/2024 139  136 - 145 mmol/L Final    Potassium 12/05/2024 3.6  3.5 - 5.1 mmol/L Final    Chloride 12/05/2024 108 (H)  98 - 107 mmol/L Final    CO2 12/05/2024 24  23 - 31 mmol/L Final    Anion Gap 12/05/2024 11  7 - 16 mmol/L Final    Glucose 12/05/2024 116 (H)  82 - 115 mg/dL Final    BUN 12/05/2024 18  10 - 20 mg/dL Final    Creatinine 12/05/2024 0.98  0.55 - 1.02 mg/dL Final    BUN/Creatinine Ratio 12/05/2024 18  6 - 20 Final    Calcium 12/05/2024 9.0  8.4 - 10.2 mg/dL Final    Total Protein 12/05/2024 6.8  5.8 - 7.6 g/dL Final    Albumin 12/05/2024 3.8  3.4 - 4.8 g/dL Final    Globulin 12/05/2024 3.0  2.0 - 4.0 g/dL Final    A/G Ratio 12/05/2024 1.3   Final    Bilirubin, Total 12/05/2024 1.1  <=1.5 mg/dL Final    Alk Phos 12/05/2024 85  40 - 150  U/L Final    ALT 12/05/2024 33  <=55 U/L Final    AST 12/05/2024 41 (H)  5 - 34 U/L Final    eGFR 12/05/2024 63  >=60 mL/min/1.73m2 Final    TSH 12/05/2024 0.495  0.350 - 4.940 uIU/mL Final    Vitamin D 25-Hydroxy, Blood 12/05/2024 42.2  30.0 - 80.0 ng/mL Final    Vitamin D 25-OH Adult Reference Values:  Deficiency: <20 ng/mL  Insufficiency: 20 - <30 ng/mL  Sufficiency: 30 -100 ng/mL    Vitamin D 25-OH Pediatric Reference Values:  Deficiency: <15 ng/mL  Insufficiency: 15 - <20 ng/mL  Sufficiency: 20 - 100 ng/mL    WBC 12/05/2024 7.08  4.50 - 11.00 K/uL Final    RBC 12/05/2024 4.66  4.20 - 5.40 M/uL Final    Hemoglobin 12/05/2024 13.7  12.0 - 16.0 g/dL Final    Hematocrit 12/05/2024 41.6  38.0 - 47.0 % Final    MCV 12/05/2024 89.3  80.0 - 96.0 fL Final    MCH 12/05/2024 29.4  27.0 - 31.0 pg Final    MCHC 12/05/2024 32.9  32.0 - 36.0 g/dL Final    RDW 12/05/2024 12.4  11.5 - 14.5 % Final    Platelet Count 12/05/2024 231  150 - 400 K/uL Final    MPV 12/05/2024 10.7  9.4 - 12.4 fL Final    Neutrophils % 12/05/2024 60.8  53.0 - 65.0 % Final    Lymphocytes % 12/05/2024 22.9 (L)  27.0 - 41.0 % Final    Monocytes % 12/05/2024 10.6 (H)  2.0 - 6.0 % Final    Eosinophils % 12/05/2024 4.8 (H)  1.0 - 4.0 % Final    Basophils % 12/05/2024 0.6  0.0 - 1.0 % Final    Immature Granulocytes % 12/05/2024 0.3  0.0 - 0.4 % Final    nRBC, Auto 12/05/2024 0.0  <=0.0 % Final    Neutrophils, Abs 12/05/2024 4.31  1.80 - 7.70 K/uL Final    Lymphocytes, Absolute 12/05/2024 1.62  1.00 - 4.80 K/uL Final    Monocytes, Absolute 12/05/2024 0.75  0.00 - 0.80 K/uL Final    Eosinophils, Absolute 12/05/2024 0.34  0.00 - 0.50 K/uL Final    Basophils, Absolute 12/05/2024 0.04  0.00 - 0.20 K/uL Final    Immature Granulocytes, Absolute 12/05/2024 0.02  0.00 - 0.04 K/uL Final    nRBC, Absolute 12/05/2024 0.00  <=0.00 x10e3/uL Final    Diff Type 12/05/2024 Auto   Final          Objective   Blood pressure 130/88, pulse 93, temperature 97.9 °F (36.6 °C),  "temperature source Oral, resp. rate 20, height 5' 1" (1.549 m), weight 61.9 kg (136 lb 8 oz), SpO2 95%.    Physical Exam  Constitutional:       Appearance: Normal appearance.   HENT:      Head: Normocephalic and atraumatic.      Right Ear: External ear normal.      Left Ear: External ear normal.      Nose: Nose normal.      Mouth/Throat:      Mouth: Mucous membranes are moist.   Eyes:      Conjunctiva/sclera: Conjunctivae normal.      Pupils: Pupils are equal, round, and reactive to light.   Cardiovascular:      Rate and Rhythm: Normal rate and regular rhythm.      Pulses: Normal pulses.      Heart sounds: Normal heart sounds.   Pulmonary:      Effort: Pulmonary effort is normal.      Breath sounds: Normal breath sounds.   Abdominal:      General: Abdomen is flat.      Palpations: Abdomen is soft.   Musculoskeletal:         General: Normal range of motion.      Cervical back: Normal range of motion and neck supple.   Skin:     General: Skin is warm and dry.   Neurological:      General: No focal deficit present.      Mental Status: She is alert and oriented to person, place, and time.   Psychiatric:         Mood and Affect: Mood normal.         Behavior: Behavior normal.         Thought Content: Thought content normal.         Judgment: Judgment normal.            Assessment and Plan   Assessment & Plan    IMPRESSION:  - Evaluated patient's cognitive concerns; no clear indication for neurocognitive testing at this time  - Considered low-dose CT lung cancer screening, but patient does not meet current criteria due to smoking cessation >15 years ago  - Reviewed previous bone density results showing osteopenia; plan to reassess in 1-2 years  - Assessed lipid management; patient tolerating reduced Crestor dose well    OSTEOPENIA:  - Reviewed the patient's DEXA scan, which showed osteopenia.  - Educated the patient about vitamin D supplementation and its relationship to bone health.  - Continued OTC Vi-Active calcium + " vitamin D supplement.  - Recommend increasing calcium intake through diet, specifically from sources such as yogurt and milk.  - Suggested considering a women's Centrum Silver vitamin supplement for additional nutritional support.  - Plan to reassess the condition in 2 years.    VITAMIN D DEFICIENCY:  - Noted the patient's previous vitamin D level was 20, which is considered low.  - Educated the patient about vitamin D supplementation and its relationship to bone health and statin side effects.  - Continued OTC Vi-Active calcium + vitamin D supplement.  - Recommend starting OTC women's Centrum Silver vitamin daily for additional vitamin D supplementation.  - Ordered vitamin D level test to reassess current status.    SMOKING HISTORY:  - Noted the patient's history of smoking for approximately 30 years, with cessation in 2005.  - Explained criteria for low-dose CT lung cancer screening eligibility.  - Discussed the possibility of performing a plain chest XR if needed.  - Mentioned the option of conducting a CT chest scan if necessary.    DEPRESSION:  - Continued Celexa (citalopram) 10 mg daily for depression management (previously taking 20 mg cut in half).    HYPERLIPIDEMIA:  - Reviewed previous lipid panel results: total cholesterol 157, LDL 81, HDL 75, and triglycerides 80.  - Continued Crestor (rosuvastatin) 10 mg, taking half tablet daily (5 mg effective dose).  - Ordered lipid panel for reassessment.  - Discussed the possibility of adding Zetia (ezetimibe) if needed for further lipid management.    COGNITIVE CONCERNS:  - Addressed patient's concerns about constantly forgetting things and experiencing word-finding difficulties.  - Discussed normal age-related cognitive changes versus dementia.  - Mentioned the possibility of neurological testing if needed.  - Recommend lifestyle modifications such as getting adequate sleep, minimizing stroke risk, and exercising regularly.  - Noted family history of Alzheimer's  disease in grandfather and cognitive issues in mother.    DIET AND NUTRITION:  - Advised the patient to continue following a Mediterranean-style diet.  - Recommend increasing calcium intake through diet, specifically from sources such as yogurt and milk.  - Encouraged the patient to plan meals to reduce junk food consumption.  - Supported the patient's expressed intention to improve diet and meal planning.    LABS:  - Ordered comprehensive labs including lipid panel, comprehensive metabolic panel, complete blood count, and thyroid panel for cardiovascular screening and general health assessment.    RSV VACCINATION:  - Educated on RSV vaccine recommendations for older adults with frequent exposure to young children.    SHINGLES VACCINATION:  - Patient to consider scheduling appointment for Shingrix vaccine series at pharmacy (recommende  d to be done on a Friday due to potential side effects).    FLU VACCINATION:  - High-dose flu vaccine administered in office.    FOLLOW UP:  - Follow up in 6 months or sooner if concerns arise.         1. Primary hypertension  -     Lipid Panel  -     CBC Auto Differential  -     Comprehensive Metabolic Panel  -     TSH  -     Hemoglobin A1C; Future; Expected date: 12/15/2025  -     Lipid Panel; Future; Expected date: 12/15/2025  -     CBC Auto Differential; Future; Expected date: 12/15/2025  -     Comprehensive Metabolic Panel; Future; Expected date: 12/15/2025  -     TSH; Future; Expected date: 12/15/2025  -     Vitamin D; Future; Expected date: 12/15/2025    2. Need for immunization against influenza  -     influenza (adjuvanted) (Fluad) 45 mcg/0.5 mL IM vaccine (> or = 66 yo) 0.5 mL    3. Pure hypercholesterolemia  -     Lipid Panel  -     CBC Auto Differential  -     Comprehensive Metabolic Panel  -     TSH  -     Hemoglobin A1C; Future; Expected date: 12/15/2025  -     Lipid Panel; Future; Expected date: 12/15/2025  -     CBC Auto Differential; Future; Expected date:  12/15/2025  -     Comprehensive Metabolic Panel; Future; Expected date: 12/15/2025  -     TSH; Future; Expected date: 12/15/2025  -     Vitamin D; Future; Expected date: 12/15/2025    4. Vitamin D deficiency  -     Vitamin D  -     Hemoglobin A1C; Future; Expected date: 12/15/2025  -     Lipid Panel; Future; Expected date: 12/15/2025  -     CBC Auto Differential; Future; Expected date: 12/15/2025  -     Comprehensive Metabolic Panel; Future; Expected date: 12/15/2025  -     TSH; Future; Expected date: 12/15/2025  -     Vitamin D; Future; Expected date: 12/15/2025    5. Hyperglycemia  -     Hemoglobin A1C; Future; Expected date: 12/15/2025    Other orders  -     citalopram (CELEXA) 10 MG tablet; Take 1 tablet (10 mg total) by mouth once daily.  Dispense: 90 tablet; Refill: 3                 Follow up in about 1 year (around 12/5/2025) for with lab. .

## 2025-02-03 ENCOUNTER — HOSPITAL ENCOUNTER (EMERGENCY)
Facility: HOSPITAL | Age: 70
Discharge: HOME | End: 2025-02-03
Payer: MEDICARE

## 2025-02-03 VITALS
DIASTOLIC BLOOD PRESSURE: 87 MMHG | HEIGHT: 61 IN | WEIGHT: 135 LBS | TEMPERATURE: 99 F | HEART RATE: 91 BPM | OXYGEN SATURATION: 96 % | BODY MASS INDEX: 25.49 KG/M2 | RESPIRATION RATE: 16 BRPM | SYSTOLIC BLOOD PRESSURE: 137 MMHG

## 2025-02-03 DIAGNOSIS — T14.90XA INJURY: ICD-10-CM

## 2025-02-03 DIAGNOSIS — S93.402A SPRAIN OF LEFT ANKLE, UNSPECIFIED LIGAMENT, INITIAL ENCOUNTER: Primary | ICD-10-CM

## 2025-02-03 PROCEDURE — 99283 EMERGENCY DEPT VISIT LOW MDM: CPT | Mod: 25

## 2025-02-04 NOTE — DISCHARGE INSTRUCTIONS
Alternate Tylenol and Ibuprofen as needed for pain. Keep your foot elevated as much as possible. Ice in short intervals to affected area. Remain non weight bearing for the next two weeks. If you continue to have pain follow up with your primary care provider for recheck and continued care and management. Return to the ED for worsening signs and symptoms or otherwise as needed.

## 2025-02-04 NOTE — ED PROVIDER NOTES
Encounter Date: 2/3/2025       History     Chief Complaint   Patient presents with    Ankle Pain     Pt presents to ed with c/o having left ankle injury after twisting it on some stairs today      69 y/o WF presents to the emergency department with c/o left ankle pain and swelling. She states she twisted it on some stairs today and has had pain and swelling since. She has taken Mobic, used ice and heat and symptoms are not improving. Weight bearing makes her symptoms worse.     The history is provided by the patient.     Review of patient's allergies indicates:  No Known Allergies  Past Medical History:   Diagnosis Date    Abnormal vaginal bleeding with endometrial thickness greater than 5 mm present on transvaginal ultrasound in postmenopausal patient 10/01/2020    Atrophic vaginitis 10/11/2016    Depression     Dysuria     Essential hypertension     Hyperlipemia     UTI (urinary tract infection)     Yeast vaginitis 10/11/2016     Past Surgical History:   Procedure Laterality Date    BACK SURGERY  1988    Carbon dioxide vaginal rejuvenation laser within the vagina N/A 05/11/2021    Procedure of 1/3 steps performed today at [facility] for severe atrophic vaginitis/dyspareunia    COLONOSCOPY      DIAGNOSTIC LAPAROSCOPY N/A 04/20/2021    Procedure: LAPAROSCOPY, DIAGNOSTIC;  Surgeon: Dedrick Dunbar MD;  Location: [facility] OR;  Service: OB/GYN;  Laterality: N/A;    EXCISION OF BREAST LESION      EYE SURGERY      5 yrs ago; cataracts    HYSTEROSCOPY WITH DILATION AND CURETTAGE OF UTERUS N/A 04/20/2021    Procedure: HYSTEROSCOPY, WITH DILATION AND CURETTAGE OF UTERUS;  Surgeon: Dedrick Dunbar MD;  Location: [facility] OR;  Service: OB/GYN;  Laterality: N/A;    Laser rejuvenation carbon dioxide N/A Completion date 07/13/2021    Laser carbon dioxide rejuvenation performed for vaginal contracture atrophic vaginitis-90% improved    SPINE SURGERY  1988     Family History   Problem Relation Name Age of Onset     Stroke Paternal Grandfather RAEGAN Patel Sr     Hepatitis Maternal Grandmother      Heart disease Father Dedrick Patel     Hypertension Father Dedrick Patel     Hypertension Mother Sherrill Patel      Social History     Tobacco Use    Smoking status: Former     Current packs/day: 0.00     Average packs/day: 1 pack/day for 30.0 years (30.0 ttl pk-yrs)     Types: Cigarettes     Start date: 1975     Quit date: 2005     Years since quittin.4     Passive exposure: Past    Smokeless tobacco: Never    Tobacco comments:     Stopped smoking    Substance Use Topics    Alcohol use: Not Currently    Drug use: Never     Review of Systems   All other systems reviewed and are negative.      Physical Exam     Initial Vitals [25]   BP Pulse Resp Temp SpO2   137/87 91 16 98.6 °F (37 °C) 96 %      MAP       --         Physical Exam    Constitutional: She appears well-developed and well-nourished. She is cooperative.  Non-toxic appearance.   Cardiovascular:  Normal rate, regular rhythm and normal heart sounds.           Pulses:       Dorsalis pedis pulses are 2+ on the left side.        Posterior tibial pulses are 2+ on the left side.   Pulmonary/Chest: Effort normal and breath sounds normal.   Abdominal: Abdomen is soft. Bowel sounds are normal. There is no abdominal tenderness.   Musculoskeletal:      Left ankle: Swelling and ecchymosis present. Tenderness present over the lateral malleolus. Decreased range of motion. Normal pulse.      Comments: NVI     Neurological: She is alert and oriented to person, place, and time. GCS eye subscore is 4. GCS verbal subscore is 5. GCS motor subscore is 6.   Skin: Skin is warm, dry and intact. Capillary refill takes less than 2 seconds.         Medical Screening Exam   See Full Note    ED Course   Procedures  Labs Reviewed - No data to display       Imaging Results              X-Ray Ankle Complete Left (Final result)  Result time 25 20:50:59      Final result by  Oscar Powell MD (02/03/25 20:50:59)                   Impression:      1. Cortical irregularity about the distal fibula, correlation with any focal tenderness recommended as acute/subacute avulsion injury are considerations.      Electronically signed by: Oscar Powell MD  Date:    02/03/2025  Time:    20:50               Narrative:    EXAMINATION:  XR ANKLE COMPLETE 3 VIEW LEFT    CLINICAL HISTORY:  Injury, unspecified, initial encounter    TECHNIQUE:  AP, lateral and oblique views of the left ankle were performed.    COMPARISON:  None    FINDINGS:  Three views left ankle.    There is edema about the ankle particularly overlying the lateral malleolus.  The ankle mortise is intact.  There is a well corticated ossific structure along the distal aspect of the lateral malleolus, possibly reflecting avulsion injury although corticated nature may reflect remote injury.  No dislocation.  There are degenerative changes of the calcaneus.                                       Medications - No data to display  Medical Decision Making  71 y/o WF presents to the emergency department with c/o left ankle pain and swelling. She states she twisted it on some stairs today and has had pain and swelling since. She has taken Mobic, used ice and heat and symptoms are not improving. Weight bearing makes her symptoms worse.     Problems Addressed:  Sprain of left ankle, unspecified ligament, initial encounter:     Details: Placed in walking boot. Crutches given and educated on use. Counseled on supportive measures. Follow up instructions given. Warning s/s discussed and return precautions given; the patient has v/u.      Amount and/or Complexity of Data Reviewed  Discussion of management or test interpretation with external provider(s): Patient discussed with ED Attending, Dr. Kern. Patient examined by him. Xray reviewed by him. He recommends Non weight bearing for two weeks with close f/u.     Risk  OTC drugs.  Prescription  drug management.                                      Clinical Impression:   Final diagnoses:  [T14.90XA] Injury  [S93.402A] Sprain of left ankle, unspecified ligament, initial encounter (Primary)        ED Disposition Condition    Discharge Stable          ED Prescriptions    None       Follow-up Information    None          Gisele Arteaga FNP  02/04/25 1658       Miky Kern MD  02/05/25 0252

## 2025-04-24 ENCOUNTER — OFFICE VISIT (OUTPATIENT)
Dept: FAMILY MEDICINE | Facility: CLINIC | Age: 70
End: 2025-04-24
Payer: MEDICARE

## 2025-04-24 VITALS
RESPIRATION RATE: 20 BRPM | HEART RATE: 77 BPM | TEMPERATURE: 98 F | HEIGHT: 61 IN | SYSTOLIC BLOOD PRESSURE: 116 MMHG | OXYGEN SATURATION: 97 % | DIASTOLIC BLOOD PRESSURE: 82 MMHG | WEIGHT: 129.88 LBS | BODY MASS INDEX: 24.52 KG/M2

## 2025-04-24 DIAGNOSIS — J01.90 ACUTE NON-RECURRENT SINUSITIS, UNSPECIFIED LOCATION: Primary | ICD-10-CM

## 2025-04-24 PROCEDURE — 99213 OFFICE O/P EST LOW 20 MIN: CPT | Mod: 25,,, | Performed by: NURSE PRACTITIONER

## 2025-04-24 PROCEDURE — 96372 THER/PROPH/DIAG INJ SC/IM: CPT | Mod: ,,, | Performed by: NURSE PRACTITIONER

## 2025-04-24 RX ORDER — AMOXICILLIN 500 MG/1
500 TABLET, FILM COATED ORAL EVERY 12 HOURS
Qty: 20 TABLET | Refills: 0 | Status: SHIPPED | OUTPATIENT
Start: 2025-04-24 | End: 2025-05-04

## 2025-04-24 RX ORDER — CEFTRIAXONE 1 G/1
1 INJECTION, POWDER, FOR SOLUTION INTRAMUSCULAR; INTRAVENOUS
Status: COMPLETED | OUTPATIENT
Start: 2025-04-24 | End: 2025-04-24

## 2025-04-24 RX ORDER — PROMETHAZINE HYDROCHLORIDE AND DEXTROMETHORPHAN HYDROBROMIDE 6.25; 15 MG/5ML; MG/5ML
5 SYRUP ORAL NIGHTLY PRN
Qty: 120 ML | Refills: 0 | Status: SHIPPED | OUTPATIENT
Start: 2025-04-24

## 2025-04-24 RX ORDER — DEXAMETHASONE SODIUM PHOSPHATE 4 MG/ML
8 INJECTION, SOLUTION INTRA-ARTICULAR; INTRALESIONAL; INTRAMUSCULAR; INTRAVENOUS; SOFT TISSUE
Status: COMPLETED | OUTPATIENT
Start: 2025-04-24 | End: 2025-04-24

## 2025-04-24 RX ADMIN — DEXAMETHASONE SODIUM PHOSPHATE 8 MG: 4 INJECTION, SOLUTION INTRA-ARTICULAR; INTRALESIONAL; INTRAMUSCULAR; INTRAVENOUS; SOFT TISSUE at 03:04

## 2025-04-24 RX ADMIN — CEFTRIAXONE 1 G: 1 INJECTION, POWDER, FOR SOLUTION INTRAMUSCULAR; INTRAVENOUS at 03:04

## 2025-05-12 NOTE — PROGRESS NOTES
Raine Garnica NP   [address] [place] [address]     PATIENT NAME: Madeleine Koch  : 1955  DATE: 25  MRN: [id number]      Billing Provider: Raine Garnica NP  Level of Service: [place] OFFICE/OUTPT VISIT, EST, LEVL III, 20-29 MIN  Patient PCP Information       Provider PCP Type    Cora Caal MD General            Reason for Visit / Chief Complaint: Cough and Nasal Congestion (Symptoms x11 days)       Update PCP  Update Chief Complaint         History of Present Illness / Problem Focused Workflow     Madeleine Koch presents to the clinic with Cough and Nasal Congestion (Symptoms x11 days)     History of Present Illness    HPI:  Patient reports cold symptoms and nasal congestion for approximately 11 days. She reports improvement but remains concerned about the persistence of symptoms. She has coughing, which has caused soreness, and significant fatigue. The cough occurs throughout the day but is particularly bothersome at night, affecting sleep. Sleep quality has improved in the last couple of nights. She also mentions throat discomfort, describing it as postnasal drip. She has been taking Benadryl and Zoloft to manage her symptoms. She expresses concern about recovering in time for her mother's 89th birthday on Monday.    She denies any allergies and recent use of antibiotics.    MEDICATIONS:  Patient is on Benadryl and Zoloft.    FAMILY HISTORY:  Family history is significant for mother, who is turning 89 years old on Monday.    ALLERGIES:  Patient reports no known allergies.      ROS:  General: -fever, -chills, +fatigue, -weight gain, -weight loss, +sleep disturbances  Eyes: -vision changes, -redness, -discharge  ENT: -ear pain, +nasal congestion, -sore throat, +post nasal drip  Cardiovascular: -chest pain, -palpitations, -lower extremity edema  Respiratory: +cough, -shortness of breath, +waking at night coughing  Gastrointestinal: -abdominal pain, -nausea, -vomiting, -diarrhea,  -constipation, -blood in stool  Genitourinary: -dysuria, -hematuria, -frequency  Musculoskeletal: -joint pain, -muscle pain  Skin: -rash, -lesion  Neurological: -headache, -dizziness, -numbness, -tingling  Psychiatric: -anxiety, -depression, -sleep difficulty                Medical / Social / Family History     Past Medical History:   Diagnosis Date    Abnormal vaginal bleeding with endometrial thickness greater than 5 mm present on transvaginal ultrasound in postmenopausal patient 10/01/2020    Atrophic vaginitis 10/11/2016    Depression     Dysuria     Essential hypertension     Hyperlipemia     UTI (urinary tract infection)     Yeast vaginitis 10/11/2016       Past Surgical History:   Procedure Laterality Date    BACK SURGERY  1988    Carbon dioxide vaginal rejuvenation laser within the vagina N/A 05/11/2021    Procedure of 1/3 steps performed today at [facility] [facility] for severe atrophic vaginitis/dyspareunia    COLONOSCOPY      DIAGNOSTIC LAPAROSCOPY N/A 04/20/2021    Procedure: LAPAROSCOPY, DIAGNOSTIC;  Surgeon: Dedrick Dunbar MD;  Location: [facility] OR;  Service: OB/GYN;  Laterality: N/A;    EXCISION OF BREAST LESION      EYE SURGERY      5 yrs ago; cataracts    HYSTEROSCOPY WITH DILATION AND CURETTAGE OF UTERUS N/A 04/20/2021    Procedure: HYSTEROSCOPY, WITH DILATION AND CURETTAGE OF UTERUS;  Surgeon: Dedrick Dunbar MD;  Location: [facility] OR;  Service: OB/GYN;  Laterality: N/A;    Laser rejuvenation carbon dioxide N/A Completion date 07/13/2021    Laser carbon dioxide rejuvenation performed for vaginal contracture atrophic vaginitis-90% improved    SPINE SURGERY  1988       Social History  Ms.  reports that she quit smoking about 19 years ago. Her smoking use included cigarettes. She started smoking about 49 years ago. She has a 30 pack-year smoking history. She has been exposed to tobacco smoke. She has never used smokeless tobacco. She reports that she does not currently use alcohol. She reports  "that she does not use drugs.    Family History  Ms.'s family history includes Heart disease in her father; Hepatitis in her maternal grandmother; Hypertension in her father and mother; Stroke in her paternal grandfather.    Medications and Allergies     Medications  Outpatient Medications Marked as Taking for the 4/24/25 encounter (Office Visit) with Raine Garnica NP   Medication Sig Dispense Refill    citalopram (CELEXA) 10 MG tablet Take 1 tablet (10 mg total) by mouth once daily. 90 tablet 3    ezetimibe (ZETIA) 10 mg tablet Take 1 tablet (10 mg total) by mouth once daily. 90 tablet 3    famotidine (PEPCID) 20 MG tablet Take 20 mg by mouth 2 (two) times daily.      latanoprost 0.005 % ophthalmic solution Place 1 drop into both eyes every evening.      lisinopriL (PRINIVIL,ZESTRIL) 20 MG tablet TAKE 1 TABLET DAILY 90 tablet 3       Allergies  Review of patient's allergies indicates:  No Known Allergies    Physical Examination   /82 (BP Location: Left arm, Patient Position: Sitting)   Pulse 77   Temp 98.3 °F (36.8 °C) (Oral)   Resp 20   Ht 5' 1" (1.549 m)   Wt 58.9 kg (129 lb 14.4 oz)   SpO2 97%   BMI 24.54 kg/m²    Physical Exam    General: No acute distress. Well-developed. Well-nourished.  Eyes: EOMI. Sclerae anicteric.  HENT: Normocephalic. Atraumatic. Nares patent. Moist oral mucosa. Pharyngeal erythema. Posterior oropharyngeal erythema.  Ears: Lateral TM dull and full without erythema.  Cardiovascular: Regular rate. Regular rhythm. No murmurs. No rubs. No gallops. Normal S1, S2.  Respiratory: Normal respiratory effort. Clear to auscultation bilaterally. No rales. No rhonchi. No wheezing.  Musculoskeletal: No  obvious deformity.  Extremities: No lower extremity edema.  Neurological: Alert & oriented x3. No slurred speech. Normal gait.  Psychiatric: Normal mood. Normal affect. Good insight. Good judgment.  Skin: Warm. Dry. No rash.           Assessment and Plan (including Health Maintenance) "      Problem List  Smart Sets  Document Outside HM   :    Assessment & Plan    IMPRESSION:  - Presenting with persistent cold symptoms and nasal congestion for 11 days.    - Administered injectable medication in office.  - Prescribed oral antibiotic.  - Patient reports nasal congestion for approximately 11 days, accompanied by fatigue and soreness from coughing.  - Observed fluid in both ears and injected posterior pharynx with post-nasal drip during exam.  - Acknowledged the patient's symptoms and formulated a treatment plan.  - Prescribed cough medicine to alleviate symptoms.        Health Maintenance Due   Topic Date Due    RSV Vaccine (Age 60+ and Pregnant patients) (1 - Risk 60-74 years 1-dose series) Never done    Shingles Vaccine (2 of 3) 06/15/2016    COVID-19 Vaccine (3 - 2024-25 season) 09/01/2024    Mammogram  02/15/2025       Problem List Items Addressed This Visit    None  Visit Diagnoses         Acute non-recurrent sinusitis, unspecified location    -  Primary    Relevant Medications    dexAMETHasone injection 8 mg (Completed)    cefTRIAXone injection 1 g (Completed)    promethazine-dextromethorphan (PROMETHAZINE-DM) 6.25-15 mg/5 mL Syrp            Health Maintenance Topics with due status: Not Due       Topic Last Completion Date    TETANUS VACCINE 05/17/2021    DEXA Scan 11/01/2023    Colorectal Cancer Screening 02/08/2024    Lipid Panel 12/05/2024       No future appointments.         Signature:  Raine Garnica NP      [address] [place] [address]    Date of encounter: 4/24/25

## (undated) DEVICE — GLOVE SURGICAL PROTEXIS PI SIZE 7.5

## (undated) DEVICE — SET IRRIG CYSTO/BLADDER 78IN

## (undated) DEVICE — GLOVE SURGICAL PROTEXIS PI SIZE 6.5

## (undated) DEVICE — WARMER SCOPE DISP

## (undated) DEVICE — Device

## (undated) DEVICE — TROCAR BLADELESS Z-THREAD 5MM X 100MM

## (undated) DEVICE — GOWN SURGICAL STERILE LEVEL 3 / XX-LARGE

## (undated) DEVICE — SOL IRRIGATION SALINE 3000ML BAG

## (undated) DEVICE — TROCAR SLEEVE Z-THREAD 5MM X 100MM

## (undated) DEVICE — GLOVE SURGICAL PROTEXIS PI SIZE 7

## (undated) DEVICE — NEEDLE INSUFFLATION DISP 14G X 120

## (undated) DEVICE — SOL IRRIGATION SALINE 0.9% 1000ML BOTTLE

## (undated) DEVICE — CDS GYN LAPAROSCOPY

## (undated) DEVICE — BLADE CARBON SURG SS SZ 15 STERILE

## (undated) DEVICE — MARYLAND LIGASURE JAW DEVICE

## (undated) DEVICE — DISSECTOR KITTNER ENDO BLUNT DISP

## (undated) DEVICE — GLOVE SURGICAL PROTEXIS PI BLUE SIZE 7.0

## (undated) DEVICE — APPLICATOR CHLORAPREP HI-LITE TINTED ORANGE 26ML

## (undated) DEVICE — SUTURE MONOCRYL 4-0 18 PS-2